# Patient Record
Sex: MALE | Race: WHITE | NOT HISPANIC OR LATINO | Employment: UNEMPLOYED | URBAN - NONMETROPOLITAN AREA
[De-identification: names, ages, dates, MRNs, and addresses within clinical notes are randomized per-mention and may not be internally consistent; named-entity substitution may affect disease eponyms.]

---

## 2017-02-16 ENCOUNTER — TRANSCRIBE ORDERS (OUTPATIENT)
Dept: PHYSICAL THERAPY | Facility: HOSPITAL | Age: 42
End: 2017-02-16

## 2017-02-16 DIAGNOSIS — M75.122 COMPLETE ROTATOR CUFF TEAR OR RUPTURE OF LEFT SHOULDER, NOT SPECIFIED AS TRAUMATIC: Primary | ICD-10-CM

## 2017-02-21 ENCOUNTER — HOSPITAL ENCOUNTER (OUTPATIENT)
Dept: PHYSICAL THERAPY | Facility: HOSPITAL | Age: 42
Setting detail: THERAPIES SERIES
Discharge: HOME | End: 2017-02-21

## 2017-02-21 DIAGNOSIS — M25.512 LEFT SHOULDER PAIN, UNSPECIFIED CHRONICITY: Primary | ICD-10-CM

## 2017-02-21 PROCEDURE — 97110 THERAPEUTIC EXERCISES: CPT | Performed by: PHYSICAL THERAPIST

## 2017-02-21 PROCEDURE — 97140 MANUAL THERAPY 1/> REGIONS: CPT | Performed by: PHYSICAL THERAPIST

## 2017-02-21 PROCEDURE — G8985 CARRY GOAL STATUS: HCPCS | Performed by: PHYSICAL THERAPIST

## 2017-02-21 PROCEDURE — G8984 CARRY CURRENT STATUS: HCPCS | Performed by: PHYSICAL THERAPIST

## 2017-02-21 PROCEDURE — 97161 PT EVAL LOW COMPLEX 20 MIN: CPT | Performed by: PHYSICAL THERAPIST

## 2017-02-21 NOTE — PROGRESS NOTES
Outpatient Physical Therapy Ortho Initial Evaluation  [facility]     Patient Name: Jose Sal  : 1975  MRN: [id number]  Today's Date: 2017      Visit Date: 2017      Visit 1 of 1. Patient only wants HEP  Recert date 3/7/17   MD visit 3/3/17      There is no problem list on file for this patient.       Past Medical History   Diagnosis Date   • Arthritis    • Diabetes mellitus    • Hypertension         No past surgical history on file.    Visit Dx:     ICD-10-CM ICD-9-CM   1. Left shoulder pain, unspecified chronicity M25.512 719.41             Patient History       17 0900          History    Chief Complaint Pain  -KW      Type of Pain Shoulder pain  -KW      Date Current Problem(s) Began 16  -KW      Brief Description of Current Complaint L shoulder pain  -KW      Previous treatment for THIS PROBLEM Injections;Medication  -KW      Hand Dominance right-handed  -KW      Occupation/sports/leisure activities [employer]  -KW      Pain     Pain Location Shoulder  -KW      Pain at Present 2  -KW      Pain at Best 2  -KW      Pain at Worst 7  -KW      Pain Description Squeezing;Sharp  -KW        User Key  (r) = Recorded By, (t) = Taken By, (c) = Cosigned By    Initials Name Provider Type    KW Roxanne Alonso, PT Physical Therapist                PT Ortho       17 1000    Subjective Comments    Subjective Comments Reports he does not want to pay $120 a week for PT. Just wants HEP  -KW    Special Tests/Palpation    Special Tests/Palpation --   positive L impingement sign  -KW    Shoulder Impingement/Rotator Cuff Special Tests    Internal Impingement Sign Left:;Positive  -KW    ROM (Range of Motion)    General ROM Detail L sh flex 0-150, abd 0-109, ext 0-65, IR 0-70, ER 0-30  -KW    MMT (Manual Muscle Testing)    General MMT Assessment Detail L sh flex 4/5, abd 3+/5, IE and ER 4/5  -KW      User Key  (r) = Recorded By, (t) = Taken By, (c) = Cosigned By    Initials Name  Provider Type    SABRINA Alonso, PT Physical Therapist                            Therapy Education       02/21/17 0955    Therapy Education    Given HEP  -KW    Program New  -KW    How Provided Demonstration  -KW    Provided to Patient  -KW    Level of Understanding Demonstrated  -KW      User Key  (r) = Recorded By, (t) = Taken By, (c) = Cosigned By    Initials Name Provider Type    SABRINA Alonso, PT Physical Therapist                PT OP Goals       02/21/17 1000          PT Short Term Goals    STG Date to Achieve 03/07/17  -KW      STG 1 decrease  to 1/10 pain in L shoulder  -KW      STG 2 Inc L shoulder AROM to WNL  -KW      STG 3 Inc L sh abd MS to 4-/5  -KW      STG 4 decrease Quick dash score to < or equal to 40  -KW      Time Calculation    PT Goal Re-Cert Due Date 03/07/17  -KW        User Key  (r) = Recorded By, (t) = Taken By, (c) = Cosigned By    Initials Name Provider Type    SABRINA Alonso, PT Physical Therapist                PT Assessment/Plan       02/21/17 1014 02/21/17 0955       PT Assessment    Functional Limitations  Impaired gait  -KW     Impairments  Pain;Muscle strength;Range of motion;Joint integrity;Joint mobility  -KW     Assessment Comments pain with most exercises  -KW      Please refer to paper survey for additional self-reported information  No  -KW     Rehab Potential  Good  -KW     Patient/caregiver participated in establishment of treatment plan and goals  Yes  -KW     Patient would benefit from skilled therapy intervention  Yes  -KW     PT Plan    Planned CPT's?  PT EVAL: 15082;PT RE-EVAL: 72838;PT THER PROC EA 15 MIN: 30967;PT THER ACT EA 15 MIN: 17419;PT MANUAL THERAPY EA 15 MIN: 26770;PT THER SUPP EA 15 MIN  -KW     Physical Therapy Interventions (Optional Details)  home exercise program;ROM (Range of Motion);strengthening;stretching;modalities;manual therapy techniques  -KW     PT Plan Comments  Patient wants to do HEP only  -KW       User Key  (r) = Recorded  By, (t) = Taken By, (c) = Cosigned By    Initials Name Provider Type    SABRINA Alonso, ASHLIE Physical Therapist                Modalities       02/21/17 1000          Ice    Rx Minutes 10 mins  -KW      Ice S/P Rx Yes  -KW        User Key  (r) = Recorded By, (t) = Taken By, (c) = Cosigned By    Initials Name Provider Type    SABRINA Alonso, PT Physical Therapist              Exercises       02/21/17 1000          Subjective Comments    Subjective Comments Reports he does not want to pay $120 a week for PT. Just wants HEP  -KW      Subjective Pain    Able to rate subjective pain? yes  -KW      Pre-Treatment Pain Level 2  -KW      Post-Treatment Pain Level 2  -KW      Exercise 1    Exercise Name 1 pulleys  -KW      Time (Minutes) 1 10 min  -KW      Exercise 2    Exercise Name 2 4 way shoulder w/ Red TB  -KW      Sets 2 1  -KW      Reps 2 10  -KW        User Key  (r) = Recorded By, (t) = Taken By, (c) = Cosigned By    Initials Name Provider Type    SABRINA Alonso, ASHLIE Physical Therapist           Manual Rx (last 36 hours)      Manual Treatments       02/21/17 0900          Manual Rx 1    Manual Rx 1 Location L shoulder  -KW      Manual Rx 1 Type cupping  -KW      Manual Rx 1 Duration 10 min  -KW        User Key  (r) = Recorded By, (t) = Taken By, (c) = Cosigned By    Initials Name Provider Type    SABRINA Alonso, PT Physical Therapist                            Outcome Measures       02/21/17 1000          Quick DASH    Open a tight or new jar. 1  -KW      Do heavy household chores (e.g., wash walls, wash floors) 3  -KW      Carry a shopping bag or briefcase 3  -KW      Wash your back 5  -KW      Use a knife to cut food 1  -KW      Recreational activities in which you take some force or impact through your arm, should or hand (e.g. golf, hammering, tennis, etc.) 4  -KW      During the past week, to what extent has your arm, shoulder, or hand problem interfered with your normal social activites with  family, friends, neighbors or groups? 3  -KW      During the past week, were you limited in your work or other regular daily activities as a result of your arm, shoulder or hand problem? 3  -KW      Arm, Shoulder, or hand pain 3  -KW      Tingling (pins and needles) in your arm, shoulder, or hand 4  -KW      During the past week, how much difficulty have you had sleeping because of the pain in your arm, shoulder or hand? 3  -KW      Number of Questions Answered 11  -KW      Quick DASH Score 50  -KW      Functional Assessment    Outcome Measure Options Quick DASH  -KW        User Key  (r) = Recorded By, (t) = Taken By, (c) = Cosigned By    Initials Name Provider Type    KW Roxanne Alonso, PT Physical Therapist            Time Calculation:   Start Time: 0900  Stop Time: 1000  Time Calculation (min): 60 min  Total Timed Code Minutes- PT: 60 minute(s)     Therapy Charges for Today     Code Description Service Date Service Provider Modifiers Qty    98269428845 HC PT CARRY MOV HAND OBJ CURRENT 2/21/2017 Roxanne Alonso, PT GP, CK 1    58686925537 HC PT CARRY MOV HAND OBJ PROJECTED 2/21/2017 Roxanne Alonso, PT GP, CJ 1    73653713097 HC PT THER PROC EA 15 MIN 2/21/2017 Roxanne Alonso, PT GP 1    49768624207 HC PT MANUAL THERAPY EA 15 MIN 2/21/2017 Roxanne Alonso, PT GP 1    22390126437 HC PT EVAL LOW COMPLEXITY 2 2/21/2017 Roxanne Alonso, PT GP 1          PT G-Codes  PT Professional Judgement Used?: Yes  Outcome Measure Options: Quick DASH  Functional Limitation: Carrying, moving and handling objects  Carrying, Moving and Handling Objects Current Status (): At least 40 percent but less than 60 percent impaired, limited or restricted  Carrying, Moving and Handling Objects Goal Status (): At least 20 percent but less than 40 percent impaired, limited or restricted         Roxanne Alonso, PT  2/21/2017

## 2017-04-06 ENCOUNTER — HOSPITAL ENCOUNTER (OUTPATIENT)
Dept: PHYSICAL THERAPY | Facility: HOSPITAL | Age: 42
Setting detail: THERAPIES SERIES
Discharge: HOME | End: 2017-04-06

## 2017-04-06 DIAGNOSIS — M12.812 ROTATOR CUFF ARTHROPATHY, LEFT: Primary | ICD-10-CM

## 2017-04-06 PROCEDURE — 97110 THERAPEUTIC EXERCISES: CPT | Performed by: PHYSICAL THERAPIST

## 2017-04-06 PROCEDURE — G8985 CARRY GOAL STATUS: HCPCS | Performed by: PHYSICAL THERAPIST

## 2017-04-06 PROCEDURE — G8984 CARRY CURRENT STATUS: HCPCS | Performed by: PHYSICAL THERAPIST

## 2017-04-06 PROCEDURE — 97162 PT EVAL MOD COMPLEX 30 MIN: CPT | Performed by: PHYSICAL THERAPIST

## 2017-04-06 NOTE — PROGRESS NOTES
Outpatient Physical Therapy Ortho Initial Evaluation  [facility]     Patient Name: Jose Sal  : 1975  MRN: [id number]  Today's Date: 2017      Visit Date: 2017    Visit   Recert date 17  MD visit 17                There is no problem list on file for this patient.       Past Medical History:   Diagnosis Date   • Arthritis    • Diabetes mellitus    • Hypertension         No past surgical history on file.    Visit Dx:     ICD-10-CM ICD-9-CM   1. Rotator cuff arthropathy, left M12.812 716.81             Patient History       17 1100          History    Chief Complaint Pain  -KW      Type of Pain Shoulder pain  -KW      Patient/Caregiver Goals Relieve pain  -KW      Current Tobacco Use none  -KW      Smoking Status none  -KW      Patient's Rating of General Health Good  -KW      Hand Dominance right-handed  -KW      Surgery CPT Code 1 --   L shoulder scope  -KW      Surgery Date 1 17  -KW      Pain     Pain Location Shoulder  -KW      Pain at Present 4  -KW      Pain at Best 3  -KW      Pain at Worst 8  -KW      Pain Frequency Intermittent  -KW      What Performance Factors Make the Current Problem(s) WORSE? movement  -KW      What Performance Factors Make the Current Problem(s) BETTER? rest meds  -KW      Fall Risk Assessment    Any falls in the past year: No  -KW      Daily Activities    Primary Language English  -KW        User Key  (r) = Recorded By, (t) = Taken By, (c) = Cosigned By    Initials Name Provider Type    KW Roxanne Alonso, PT Physical Therapist                PT Ortho       17 1100    Precautions and Contraindications    Precautions L shoulder scope done on 3/30/17  -KW    Posture/Observations    Posture- WNL Posture is WNL  -KW    Special Tests/Palpation    Special Tests/Palpation --   Held sec to recent sx  -KW    ROM (Range of Motion)    General ROM Detail PROM L sh flex 60, abd 42, ER 9, IR 70  -KW    MMT (Manual Muscle Testing)     General MMT Assessment Detail Held sec to recent sx  -KW      User Key  (r) = Recorded By, (t) = Taken By, (c) = Cosigned By    Initials Name Provider Type    SABRINA Alonso, ASHLIE Physical Therapist                            Therapy Education       04/06/17 1152          Therapy Education    Given HEP  -KW      Program New  -KW      How Provided Demonstration  -KW      Provided to Patient  -KW      Level of Understanding Demonstrated  -KW        User Key  (r) = Recorded By, (t) = Taken By, (c) = Cosigned By    Initials Name Provider Type    SABRINA Alonso, PT Physical Therapist                PT OP Goals       04/06/17 1200       PT Short Term Goals    STG Date to Achieve 04/18/17  -KW     STG 1 decrease  to 2/10 pain in L shoulder  -KW     STG 2 Inc L sh PROM by 10 degrees in all planes  -KW     STG 3 Reduce edema in L hand from mod to min  -KW     STG 4 --  -KW     Long Term Goals    LTG Date to Achieve 04/27/17  -KW     LTG 1 Ind w/ final HEP  -KW     LTG 2 eliminate edema in L hand  -KW     LTG 3 Decrease quick dach score  to 75 or less  -KW     Time Calculation    PT Goal Re-Cert Due Date 04/27/17  -KW       User Key  (r) = Recorded By, (t) = Taken By, (c) = Cosigned By    Initials Name Provider Type    SABRINA Alonso, PT Physical Therapist                PT Assessment/Plan       04/06/17 1815 04/06/17 1810    PT Assessment    Functional Limitations  Limitation in home management;Limitations in community activities;Performance in leisure activities  -KW    Impairments  Joint integrity;Joint mobility;Range of motion;Endurance;Muscle strength;Pain;Edema  -KW    Assessment Comments  Pt still very painful from L scope sx 1 weeks ago. Tolerated pendellum ok  -KW    Please refer to paper survey for additional self-reported information  No  -KW    Rehab Potential  Good  -KW    Patient/caregiver participated in establishment of treatment plan and goals  Yes  -KW    Patient would benefit from skilled  therapy intervention  Yes  -KW    PT Plan    PT Frequency  2x/week  -KW    Predicted Duration of Therapy Intervention (days/wks)  4 weeks  -KW    Planned CPT's? PT EVAL MOD COMPLELITY: 84264  -KW PT THER ACT EA 15 MIN: 82127;PT AQUATIC THERAPY EA 15 MIN: 80801;PT THER SUPP EA 15 MIN;PT RE-EVAL: 30120;PT THER PROC EA 15 MIN: 00149;PT ELECTRICAL STIM UNATTEND: ;PT MANUAL THERAPY EA 15 MIN: 11255  -KW    Physical Therapy Interventions (Optional Details)  aquatics exercise;ROM (Range of Motion);manual therapy techniques;modalities;strengthening;stretching;swiss ball techniques;home exercise program;joint mobilization  -KW    PT Plan Comments  PT 1x per week until MD visit 4/14/17  -KW      User Key  (r) = Recorded By, (t) = Taken By, (c) = Cosigned By    Initials Name Provider Type    SABRINA Alonso, PT Physical Therapist                Modalities       04/06/17 1100          Ice    Ice Applied Yes  -KW      Location L shoulder  -KW      Rx Minutes 12 mins  -KW      Ice S/P Rx Yes  -KW        User Key  (r) = Recorded By, (t) = Taken By, (c) = Cosigned By    Initials Name Provider Type    SABRINA Alonso, PT Physical Therapist              Exercises       04/06/17 1100          Subjective Comments    Subjective Comments PT is one week post op L shoulder scope on 3/30/17  -KW      Subjective Pain    Able to rate subjective pain? yes  -KW      Pre-Treatment Pain Level 4  -KW      Post-Treatment Pain Level 5  -KW      Exercise 1    Exercise Name 1 pendullum circles  -KW      Sets 1 4  -KW      Reps 1 10  -KW      Exercise 2    Exercise Name 2 elbow flex  -KW      Sets 2 2  -KW      Reps 2 10  -KW      Exercise 3    Exercise Name 3 walk it out  -KW      Sets 3 1  -KW      Reps 3 10  -KW        User Key  (r) = Recorded By, (t) = Taken By, (c) = Cosigned By    Initials Name Provider Type    SABRINA Alonso, ASHLIE Physical Therapist                              Outcome Measures       04/06/17 1200          Quick  DASH    Open a tight or new jar. 5  -KW      Do heavy household chores (e.g., wash walls, wash floors) 5  -KW      Carry a shopping bag or briefcase 5  -KW      Wash your back 5  -KW      Use a knife to cut food 5  -KW      Recreational activities in which you take some force or impact through your arm, should or hand (e.g. golf, hammering, tennis, etc.) 5  -KW      During the past week, to what extent has your arm, shoulder, or hand problem interfered with your normal social activites with family, friends, neighbors or groups? 5  -KW      During the past week, were you limited in your work or other regular daily activities as a result of your arm, shoulder or hand problem? 5  -KW      Arm, Shoulder, or hand pain 3  -KW      Tingling (pins and needles) in your arm, shoulder, or hand 5  -KW      During the past week, how much difficulty have you had sleeping because of the pain in your arm, shoulder or hand? 5  -KW      Number of Questions Answered 11  -KW      Quick DASH Score 95.45  -KW      Functional Assessment    Outcome Measure Options Quick DASH  -KW        User Key  (r) = Recorded By, (t) = Taken By, (c) = Cosigned By    Initials Name Provider Type    KW Roxanne Alonso, PT Physical Therapist            Time Calculation:   Start Time: 1110  Stop Time: 1200  Time Calculation (min): 50 min  Total Timed Code Minutes- PT: 16 minute(s)     Therapy Charges for Today     Code Description Service Date Service Provider Modifiers Qty    77320777196 HC PT THER SUPP EA 15 MIN 4/6/2017 Roxanne Alonso, PT GP 1    06623444389 HC PT THER PROC EA 15 MIN 4/6/2017 Roxanne Alonso, PT GP 1    23297477073 HC PT EVAL MOD COMPLEXITY 1 4/6/2017 Roxanne Alonso, PT GP 1    39151663041 HC PT CARRY MOV HAND OBJ CURRENT 4/6/2017 Roxanne Alonso, PT GP, CM 1    27047304987 HC PT CARRY MOV HAND OBJ PROJECTED 4/6/2017 Roxanne Alonso, PT GP, CK 1          PT G-Codes  PT Professional Judgement Used?: Yes  Outcome Measure Options:  Quick DASH  Score: 95.45  Functional Limitation: Carrying, moving and handling objects  Carrying, Moving and Handling Objects Current Status (): At least 80 percent but less than 100 percent impaired, limited or restricted  Carrying, Moving and Handling Objects Goal Status (): At least 40 percent but less than 60 percent impaired, limited or restricted         Roxanne Alonso, PT  4/6/2017

## 2017-04-13 ENCOUNTER — HOSPITAL ENCOUNTER (OUTPATIENT)
Dept: PHYSICAL THERAPY | Facility: HOSPITAL | Age: 42
Setting detail: THERAPIES SERIES
End: 2017-04-13

## 2017-04-17 ENCOUNTER — HOSPITAL ENCOUNTER (OUTPATIENT)
Dept: PHYSICAL THERAPY | Facility: HOSPITAL | Age: 42
Setting detail: THERAPIES SERIES
Discharge: HOME | End: 2017-04-17

## 2017-04-17 DIAGNOSIS — M12.812 ROTATOR CUFF ARTHROPATHY, LEFT: ICD-10-CM

## 2017-04-17 DIAGNOSIS — M25.512 CHRONIC LEFT SHOULDER PAIN: Primary | ICD-10-CM

## 2017-04-17 DIAGNOSIS — G89.29 CHRONIC LEFT SHOULDER PAIN: Primary | ICD-10-CM

## 2017-04-17 PROCEDURE — 97110 THERAPEUTIC EXERCISES: CPT | Performed by: PHYSICAL THERAPIST

## 2017-04-17 PROCEDURE — 97140 MANUAL THERAPY 1/> REGIONS: CPT | Performed by: PHYSICAL THERAPIST

## 2017-04-17 NOTE — PROGRESS NOTES
Outpatient Physical Therapy Ortho Treatment Note  [facility]     Patient Name: Jose Sal  : 1975  MRN: [id number]  Today's Date: 2017      Visit Date: 2017      Visit 2/3  Recert date 17  MD visit 5/15/17  % improvement 10%        Visit Dx:    ICD-10-CM ICD-9-CM   1. Chronic left shoulder pain M25.512 719.41    G89.29 338.29   2. Rotator cuff arthropathy, left M12.812 716.81       There is no problem list on file for this patient.       Past Medical History:   Diagnosis Date   • Arthritis    • Diabetes mellitus    • Hypertension         No past surgical history on file.                          PT Assessment/Plan       17 1150       PT Assessment    Assessment Comments more pain in L elbow. Difficulty with L elbow full ext.   -KW     PT Plan    PT Plan Comments continue PT plan of care L shouder activites as tolerated as per MD  -KW       User Key  (r) = Recorded By, (t) = Taken By, (c) = Cosigned By    Initials Name Provider Type    SABRINA Alonso, PT Physical Therapist                Modalities       17 1100          Ice    Ice Applied Yes  -KW      Location L shoulder  -KW      Rx Minutes 12 mins  -KW      Ice S/P Rx Yes  -KW        User Key  (r) = Recorded By, (t) = Taken By, (c) = Cosigned By    Initials Name Provider Type    SABRINA Alonso, PT Physical Therapist                Exercises       17 1100          Subjective Comments    Subjective Comments Pt is 2 weeks post op and reports much better mobility L shoulder  -KW      Subjective Pain    Able to rate subjective pain? yes  -KW      Pre-Treatment Pain Level 4  -KW      Post-Treatment Pain Level 6  -KW      Exercise 1    Exercise Name 1 pendullum circles  -KW      Sets 1 4  -KW      Reps 1 10  -KW      Time (Minutes) 1 10 min  -KW      Exercise 2    Exercise Name 2 elbow flex  -KW      Sets 2 2  -KW      Reps 2 10  -KW      Exercise 3    Exercise Name 3 walk it out  -KW      Sets 3 1   -KW      Reps 3 10  -KW      Exercise 4    Exercise Name 4 wand in flexion  -KW      Sets 4 2  -KW      Reps 4 10  -KW      Exercise 5    Exercise Name 5 pulleys flexion  -KW      Reps 5 20  -KW        User Key  (r) = Recorded By, (t) = Taken By, (c) = Cosigned By    Initials Name Provider Type    SABRINA Alonso, PT Physical Therapist                        Manual Rx (last 36 hours)      Manual Treatments       04/17/17 1100          Manual Rx 1    Manual Rx 1 Location L shoulder   -KW      Manual Rx 1 Type cupping  -KW      Manual Rx 1 Duration 15  -KW        User Key  (r) = Recorded By, (t) = Taken By, (c) = Cosigned By    Initials Name Provider Type    SABRINA Alonso, PT Physical Therapist                PT OP Goals       04/17/17 1100       PT Short Term Goals    STG Date to Achieve 04/18/17  -KW     STG 1 decrease  to 2/10 pain in L shoulder  -KW     STG 2 Inc L sh PROM by 10 degrees in all planes  -KW     STG 3 Reduce edema in L hand from mod to min  -KW     STG 4 decrease Quick dash score to < or equal to 40  -KW     Long Term Goals    LTG Date to Achieve 04/27/17  -KW     LTG 1 Ind w/ final HEP  -KW     LTG 2 eliminate edema in L hand  -KW     LTG 3 Decrease quick dach score  to 75 or less  -KW     Time Calculation    PT Goal Re-Cert Due Date 04/27/17  -KW       User Key  (r) = Recorded By, (t) = Taken By, (c) = Cosigned By    Initials Name Provider Type    SABRINA Alonso, PT Physical Therapist                Therapy Education       04/17/17 1150          Therapy Education    Given Pain management  -KW        User Key  (r) = Recorded By, (t) = Taken By, (c) = Cosigned By    Initials Name Provider Type    SABRINA Alonso, PT Physical Therapist                Time Calculation:   Start Time: 1100  Stop Time: 1145  Time Calculation (min): 45 min  Total Timed Code Minutes- PT: 45 minute(s)    Therapy Charges for Today     Code Description Service Date Service Provider Modifiers Qty     86394832908  PT THER SUPP EA 15 MIN 4/17/2017 Roxanne Alonso, PT GP 1    81097174186 HC PT THER PROC EA 15 MIN 4/17/2017 Roxanne Alonso, PT GP 2    84229476533 HC PT MANUAL THERAPY EA 15 MIN 4/17/2017 Roxanne Alonso, PT GP 1                    Roxanne Alonso, PT  4/17/2017

## 2017-04-24 ENCOUNTER — HOSPITAL ENCOUNTER (OUTPATIENT)
Dept: PHYSICAL THERAPY | Facility: HOSPITAL | Age: 42
Setting detail: THERAPIES SERIES
Discharge: HOME | End: 2017-04-24

## 2017-04-24 DIAGNOSIS — M25.512 CHRONIC LEFT SHOULDER PAIN: Primary | ICD-10-CM

## 2017-04-24 DIAGNOSIS — M12.812 ROTATOR CUFF ARTHROPATHY, LEFT: ICD-10-CM

## 2017-04-24 DIAGNOSIS — G89.29 CHRONIC LEFT SHOULDER PAIN: Primary | ICD-10-CM

## 2017-04-24 PROCEDURE — 97110 THERAPEUTIC EXERCISES: CPT | Performed by: PHYSICAL THERAPIST

## 2017-04-24 NOTE — PROGRESS NOTES
Outpatient Physical Therapy Ortho Progress Note  [facility]     Patient Name: Jose Sal  : 1975  MRN: [id number]  Today's Date: 2017      Visit Date: 2017      Visit 3/4  Recert date 5/15/17  MD visit 5/15/17  15% improvement          Visit Dx:    ICD-10-CM ICD-9-CM   1. Chronic left shoulder pain M25.512 719.41    G89.29 338.29   2. Rotator cuff arthropathy, left M12.812 716.81       There is no problem list on file for this patient.       Past Medical History:   Diagnosis Date   • Arthritis    • Diabetes mellitus    • Hypertension         No past surgical history on file.          PT Ortho       17 0800    ROM (Range of Motion)    General ROM Detail PROM L shouder flex 154, abd 132, IR 60, ER 82  -KW      User Key  (r) = Recorded By, (t) = Taken By, (c) = Cosigned By    Initials Name Provider Type    KW Roxanne Alonso, PT Physical Therapist                            PT Assessment/Plan       17 0910       PT Assessment    Functional Limitations Limitation in home management;Limitations in community activities;Performance in leisure activities;Performance in self-care ADL  -KW     Impairments Joint integrity;Range of motion;Joint mobility;Dexterity;Edema;Impaired flexibility;Muscle strength;Pain  -KW     Assessment Comments tenderness to ticeps tendon in the elbow, moderate swelling at L elbow. PROM flex at full ROM  -KW     Please refer to paper survey for additional self-reported information No  -KW     Rehab Potential Good  -KW     Patient/caregiver participated in establishment of treatment plan and goals Yes  -KW     Patient would benefit from skilled therapy intervention Yes  -KW     PT Plan    PT Frequency 2x/week  -KW     Predicted Duration of Therapy Intervention (days/wks) 4 weeks  -KW     Planned CPT's? PT RE-EVAL: 90510;PT THER ACT EA 15 MIN: 74496;PT ULTRASOUND EA 15 MIN: 16694;PT THER SUPP EA 15 MIN;PT MANUAL THERAPY EA 15 MIN: 11510;PT ELECTRICAL  STIM UNATTEND: ;PT THER PROC EA 15 MIN: 32883  -KW     Physical Therapy Interventions (Optional Details) aquatics exercise;ROM (Range of Motion);manual therapy techniques;strengthening;stretching;modalities;swiss ball techniques;home exercise program;joint mobilization  -KW     PT Plan Comments PT 2x per week L shoulder scope, activites as tolerated.  -KW       User Key  (r) = Recorded By, (t) = Taken By, (c) = Cosigned By    Initials Name Provider Type    SABRINA Alonso, ASHLIE Physical Therapist                Modalities       04/24/17 0800          Ice    Ice Applied Yes  -KW      Location L shoulder  -KW      Rx Minutes 12 mins  -KW      Ice S/P Rx Yes  -KW        User Key  (r) = Recorded By, (t) = Taken By, (c) = Cosigned By    Initials Name Provider Type    SABRINA Alonso, ASHLIE Physical Therapist                Exercises       04/24/17 0900 04/24/17 0800       Subjective Comments    Subjective Comments  Pt is 3 weeks post op and doing well  -KW     Subjective Pain    Able to rate subjective pain?  yes  -KW     Pre-Treatment Pain Level  3  -KW     Post-Treatment Pain Level  3  -KW     Exercise 1    Exercise Name 1 supine wand  -KW      Weights/Plates 1 1  -KW      Sets 1 2  -KW      Reps 1 10  -KW      Time (Minutes) 1 --  -KW      Exercise 2    Exercise Name 2 elbow flex  -KW      Weights/Plates 2 1  -KW      Sets 2 2  -KW      Reps 2 10  -KW      Exercise 3    Exercise Name 3 Red TB 4 way  -KW      Sets 3 2  -KW      Reps 3 10  -KW      Exercise 4    Exercise Name 4 PROM L shoulder  -KW      Sets 4 --  -KW      Reps 4 --  -KW      Time (Minutes) 4 8  -KW      Exercise 5    Exercise Name 5 pulleys flexion  -KW      Reps 5 20  -KW        User Key  (r) = Recorded By, (t) = Taken By, (c) = Cosigned By    Initials Name Provider Type    SABRINA Alonso, ASHLIE Physical Therapist                               PT OP Goals       04/24/17 0900       PT Short Term Goals    STG Date to Achieve 04/18/17  -KW      STG 1 decrease  to 2/10 pain in L shoulder  -KW     STG 2 L shoulder AROM 0-90 in flex and abd (in standing)  -KW     STG 3 Reduce edema in L hand from min to wnl  -KW     STG 4 decrease Quick dash score to < or equal to 40  -KW     Long Term Goals    LTG Date to Achieve 04/27/17  -KW     LTG 1 Ind w/ final HEP  -KW     LTG 2 eliminate edema in L hand  -KW     LTG 3 Decrease quick dach score  to 75 or less  -KW     LTG 4 Inc L shoulder AROM to full ROM  -KW     LTG 5 Inc L shoulder flex and ABD to 4-/5 MMT  -KW     Time Calculation    PT Goal Re-Cert Due Date 05/15/17  -KW       User Key  (r) = Recorded By, (t) = Taken By, (c) = Cosigned By    Initials Name Provider Type    SABRINA Alonso, PT Physical Therapist                Therapy Education       04/24/17 0906          Therapy Education    Given HEP  -KW      Program Reinforced  -KW      How Provided Verbal  -KW      Provided to Patient  -KW      Level of Understanding Verbalized  -KW        User Key  (r) = Recorded By, (t) = Taken By, (c) = Cosigned By    Initials Name Provider Type    SABRINA Alonso, PT Physical Therapist                Time Calculation:   Start Time: 0815  Stop Time: 0905  Time Calculation (min): 50 min  Total Timed Code Minutes- PT: 50 minute(s)    Therapy Charges for Today     Code Description Service Date Service Provider Modifiers Qty    48144412406 HC PT THER PROC EA 15 MIN 4/24/2017 Roxanne Alonso, PT GP 3                    Roxanne Alonso, PT  4/24/2017

## 2017-04-27 ENCOUNTER — HOSPITAL ENCOUNTER (OUTPATIENT)
Dept: PHYSICAL THERAPY | Facility: HOSPITAL | Age: 42
Setting detail: THERAPIES SERIES
Discharge: HOME | End: 2017-04-27

## 2017-04-27 DIAGNOSIS — Z98.890 S/P ROTATOR CUFF REPAIR: Primary | ICD-10-CM

## 2017-04-27 PROCEDURE — 97110 THERAPEUTIC EXERCISES: CPT | Performed by: PHYSICAL THERAPIST

## 2017-04-27 NOTE — PROGRESS NOTES
Outpatient Physical Therapy Ortho Treatment Note  [facility]     Patient Name: Jose Sal  : 1975  MRN: [id number]  Today's Date: 2017      Visit Date: 2017      Visit   Recert date 5/15/17  MD visit 5/15/17  15% improvement            Visit Dx:    ICD-10-CM ICD-9-CM   1. S/P rotator cuff repair Z98.890 V45.89       There is no problem list on file for this patient.       Past Medical History:   Diagnosis Date   • Arthritis    • Diabetes mellitus    • Hypertension         No past surgical history on file.                          PT Assessment/Plan       17 0852       PT Assessment    Assessment Comments better IR ER flexibitily  -KW     PT Plan    PT Plan Comments cont activities as tolerated L shoulder  -KW       User Key  (r) = Recorded By, (t) = Taken By, (c) = Cosigned By    Initials Name Provider Type    KW Roxanne Alonso, PT Physical Therapist                    Exercises       17 0800          Subjective Comments    Subjective Comments Reports no increase in in pain and improved shoulder flexibility  -KW      Subjective Pain    Able to rate subjective pain? yes  -KW      Pre-Treatment Pain Level 3  -KW      Post-Treatment Pain Level 3  -KW      Exercise 1    Exercise Name 1 supine wand  -KW      Weights/Plates 1 3  -KW      Sets 1 3  -KW      Reps 1 10  -KW      Exercise 2    Exercise Name 2 flexion  -KW      Weights/Plates 2 2  -KW      Sets 2 3  -KW      Reps 2 10  -KW      Exercise 3    Exercise Name 3 scaption  -KW      Sets 3 3  -KW      Reps 3 10  -KW      Exercise 4    Exercise Name 4 prone ext  -KW      Weights/Plates 4 2  -KW      Sets 4 3  -KW      Reps 4 10  -KW      Exercise 5    Exercise Name 5 prone y  -KW      Sets 5 3  -KW      Reps 5 10  -KW      Exercise 6    Exercise Name 6 side lying abd  -KW      Weights/Plates 6 2  -KW      Sets 6 3  -KW      Reps 6 10  -KW      Exercise 7    Exercise Name 7 sidelying ER  -KW      Sets 7 3  -KW       Reps 7 10  -KW      Exercise 8    Exercise Name 8 stretch IR and ER  -KW      Time (Minutes) 8 2  -KW        User Key  (r) = Recorded By, (t) = Taken By, (c) = Cosigned By    Initials Name Provider Type    SABRINA Alonso, ASHLIE Physical Therapist                               PT OP Goals       04/27/17 0800       PT Short Term Goals    STG Date to Achieve 05/08/17  -KW     STG 1 decrease  to 2/10 pain in L shoulder  -KW     STG 1 Progress Progressing  -KW     STG 2 L shoulder AROM 0-90 in flex and abd (in standing)  -KW     STG 2 Progress Progressing  -KW     STG 3 Reduce edema in L hand from min to wnl  -KW     STG 3 Progress Progressing  -KW     STG 4 decrease Quick dash score to < or equal to 40  -KW     STG 4 Progress Progressing  -KW     Long Term Goals    LTG Date to Achieve 05/15/17  -KW     LTG 1 Ind w/ final HEP  -KW     LTG 1 Progress Progressing  -KW     LTG 2 eliminate edema in L hand  -KW     LTG 2 Progress Progressing  -KW     LTG 3 Decrease quick dach score  to 75 or less  -KW     LTG 3 Progress Progressing  -KW     LTG 4 Inc L shoulder AROM to full ROM  -KW     LTG 4 Progress Progressing  -KW     LTG 5 Inc L shoulder flex and ABD to 4-/5 MMT  -KW     LTG 5 Progress Progressing  -KW     Time Calculation    PT Goal Re-Cert Due Date 05/15/17  -KW       User Key  (r) = Recorded By, (t) = Taken By, (c) = Cosigned By    Initials Name Provider Type    SABRINA Alonso, ASHLIE Physical Therapist                Therapy Education       04/27/17 0803          Therapy Education    Given HEP  -KW      Program Reinforced  -KW      How Provided Verbal  -KW      Provided to Patient  -KW      Level of Understanding Verbalized  -KW        User Key  (r) = Recorded By, (t) = Taken By, (c) = Cosigned By    Initials Name Provider Type    SABRINA Alonso, ASHLIE Physical Therapist                Time Calculation:   Start Time: 0800  Stop Time: 0845  Time Calculation (min): 45 min  Total Timed Code Minutes- PT: 45  minute(s)    Therapy Charges for Today     Code Description Service Date Service Provider Modifiers Qty    [id number]  PT THER PROC EA 15 MIN 4/27/2017 Roxanne Alonso, PT GP 3                    Roxanne Alonso, PT  4/27/2017

## 2017-05-03 ENCOUNTER — HOSPITAL ENCOUNTER (OUTPATIENT)
Dept: PHYSICAL THERAPY | Facility: HOSPITAL | Age: 42
Setting detail: THERAPIES SERIES
Discharge: HOME | End: 2017-05-03

## 2017-05-03 DIAGNOSIS — G89.29 CHRONIC LEFT SHOULDER PAIN: ICD-10-CM

## 2017-05-03 DIAGNOSIS — M25.512 CHRONIC LEFT SHOULDER PAIN: ICD-10-CM

## 2017-05-03 DIAGNOSIS — Z98.890 S/P ROTATOR CUFF REPAIR: Primary | ICD-10-CM

## 2017-05-03 PROCEDURE — 97110 THERAPEUTIC EXERCISES: CPT | Performed by: PHYSICAL THERAPIST

## 2017-05-07 ENCOUNTER — HOSPITAL ENCOUNTER (EMERGENCY)
Facility: HOSPITAL | Age: 42
Discharge: HOME | End: 2017-05-07
Attending: EMERGENCY MEDICINE | Admitting: EMERGENCY MEDICINE

## 2017-05-07 ENCOUNTER — HOSPITAL ENCOUNTER (OUTPATIENT)
Dept: CT IMAGING | Facility: HOSPITAL | Age: 42
Discharge: HOME | End: 2017-05-07
Admitting: FAMILY MEDICINE

## 2017-05-07 VITALS
TEMPERATURE: 99.2 F | RESPIRATION RATE: 20 BRPM | WEIGHT: 315 LBS | DIASTOLIC BLOOD PRESSURE: 73 MMHG | HEIGHT: 73 IN | BODY MASS INDEX: 41.75 KG/M2 | HEART RATE: 98 BPM | SYSTOLIC BLOOD PRESSURE: 150 MMHG | OXYGEN SATURATION: 97 %

## 2017-05-07 DIAGNOSIS — M25.529 ELBOW PAIN: ICD-10-CM

## 2017-05-07 DIAGNOSIS — M71.122 SEPTIC OLECRANON BURSITIS OF LEFT ELBOW: Primary | ICD-10-CM

## 2017-05-07 LAB
ANION GAP SERPL CALCULATED.3IONS-SCNC: 16 MMOL/L (ref 5–15)
BASOPHILS # BLD AUTO: 0.02 10*3/MM3 (ref 0–0.2)
BASOPHILS NFR BLD AUTO: 0.2 % (ref 0–2)
BUN BLD-MCNC: 18 MG/DL (ref 7–21)
BUN/CREAT SERPL: 18.8 (ref 7–25)
CALCIUM SPEC-SCNC: 9.6 MG/DL (ref 8.4–10.2)
CHLORIDE SERPL-SCNC: 102 MMOL/L (ref 95–110)
CO2 SERPL-SCNC: 23 MMOL/L (ref 22–31)
CREAT BLD-MCNC: 0.96 MG/DL (ref 0.7–1.3)
CRP SERPL-MCNC: 13.8 MG/DL (ref 0–1)
DEPRECATED RDW RBC AUTO: 40.8 FL (ref 35.1–43.9)
EOSINOPHIL # BLD AUTO: 0.4 10*3/MM3 (ref 0–0.7)
EOSINOPHIL NFR BLD AUTO: 4.1 % (ref 0–7)
ERYTHROCYTE [DISTWIDTH] IN BLOOD BY AUTOMATED COUNT: 13.5 % (ref 11.5–14.5)
ERYTHROCYTE [SEDIMENTATION RATE] IN BLOOD: 49 MM/HR (ref 0–15)
GFR SERPL CREATININE-BSD FRML MDRD: 87 ML/MIN/1.73 (ref 63–147)
GLUCOSE BLD-MCNC: 111 MG/DL (ref 60–100)
HCT VFR BLD AUTO: 33.9 % (ref 39–49)
HGB BLD-MCNC: 11.4 G/DL (ref 13.7–17.3)
IMM GRANULOCYTES # BLD: 0.02 10*3/MM3 (ref 0–0.02)
IMM GRANULOCYTES NFR BLD: 0.2 % (ref 0–0.5)
LYMPHOCYTES # BLD AUTO: 2.55 10*3/MM3 (ref 0.6–4.2)
LYMPHOCYTES NFR BLD AUTO: 26.5 % (ref 10–50)
MCH RBC QN AUTO: 27.5 PG (ref 26.5–34)
MCHC RBC AUTO-ENTMCNC: 33.6 G/DL (ref 31.5–36.3)
MCV RBC AUTO: 81.9 FL (ref 80–98)
MONOCYTES # BLD AUTO: 0.82 10*3/MM3 (ref 0–0.9)
MONOCYTES NFR BLD AUTO: 8.5 % (ref 0–12)
NEUTROPHILS # BLD AUTO: 5.83 10*3/MM3 (ref 2–8.6)
NEUTROPHILS NFR BLD AUTO: 60.5 % (ref 37–80)
PLATELET # BLD AUTO: 288 10*3/MM3 (ref 150–450)
PMV BLD AUTO: 10.9 FL (ref 8–12)
POTASSIUM BLD-SCNC: 3.9 MMOL/L (ref 3.5–5.1)
RBC # BLD AUTO: 4.14 10*6/MM3 (ref 4.37–5.74)
SODIUM BLD-SCNC: 141 MMOL/L (ref 137–145)
URATE SERPL-MCNC: 9.9 MG/DL (ref 2.5–8.5)
WBC NRBC COR # BLD: 9.64 10*3/MM3 (ref 3.2–9.8)

## 2017-05-07 PROCEDURE — 85025 COMPLETE CBC W/AUTO DIFF WBC: CPT | Performed by: EMERGENCY MEDICINE

## 2017-05-07 PROCEDURE — 80048 BASIC METABOLIC PNL TOTAL CA: CPT | Performed by: EMERGENCY MEDICINE

## 2017-05-07 PROCEDURE — 99283 EMERGENCY DEPT VISIT LOW MDM: CPT

## 2017-05-07 PROCEDURE — 84550 ASSAY OF BLOOD/URIC ACID: CPT | Performed by: EMERGENCY MEDICINE

## 2017-05-07 PROCEDURE — 73200 CT UPPER EXTREMITY W/O DYE: CPT

## 2017-05-07 PROCEDURE — 86140 C-REACTIVE PROTEIN: CPT | Performed by: EMERGENCY MEDICINE

## 2017-05-07 PROCEDURE — 96365 THER/PROPH/DIAG IV INF INIT: CPT

## 2017-05-07 PROCEDURE — 85651 RBC SED RATE NONAUTOMATED: CPT | Performed by: EMERGENCY MEDICINE

## 2017-05-07 RX ORDER — INDOMETHACIN 50 MG/1
50 CAPSULE ORAL 3 TIMES DAILY PRN
Qty: 25 CAPSULE | Refills: 0 | Status: SHIPPED | OUTPATIENT
Start: 2017-05-07 | End: 2017-05-31

## 2017-05-07 RX ORDER — SODIUM CHLORIDE 0.9 % (FLUSH) 0.9 %
10 SYRINGE (ML) INJECTION AS NEEDED
Status: DISCONTINUED | OUTPATIENT
Start: 2017-05-07 | End: 2017-05-07 | Stop reason: HOSPADM

## 2017-05-07 RX ADMIN — CEFAZOLIN 1 G: 1 INJECTION, POWDER, FOR SOLUTION INTRAMUSCULAR; INTRAVENOUS; PARENTERAL at 20:35

## 2017-05-09 ENCOUNTER — HOSPITAL ENCOUNTER (OUTPATIENT)
Dept: PHYSICAL THERAPY | Facility: HOSPITAL | Age: 42
Setting detail: THERAPIES SERIES
End: 2017-05-09

## 2017-05-11 ENCOUNTER — OFFICE VISIT (OUTPATIENT)
Dept: FAMILY MEDICINE CLINIC | Facility: CLINIC | Age: 42
End: 2017-05-11

## 2017-05-11 ENCOUNTER — HOSPITAL ENCOUNTER (OUTPATIENT)
Dept: PHYSICAL THERAPY | Facility: HOSPITAL | Age: 42
Setting detail: THERAPIES SERIES
End: 2017-05-11

## 2017-05-11 VITALS
DIASTOLIC BLOOD PRESSURE: 84 MMHG | SYSTOLIC BLOOD PRESSURE: 140 MMHG | HEART RATE: 117 BPM | OXYGEN SATURATION: 98 % | HEIGHT: 72 IN | WEIGHT: 314.8 LBS | BODY MASS INDEX: 42.64 KG/M2

## 2017-05-11 DIAGNOSIS — E11.8 TYPE 2 DIABETES MELLITUS WITH COMPLICATION, WITH LONG-TERM CURRENT USE OF INSULIN (HCC): Primary | ICD-10-CM

## 2017-05-11 DIAGNOSIS — M10.9 GOUT OF RIGHT WRIST, UNSPECIFIED CAUSE, UNSPECIFIED CHRONICITY: ICD-10-CM

## 2017-05-11 DIAGNOSIS — Z79.4 TYPE 2 DIABETES MELLITUS WITH COMPLICATION, WITH LONG-TERM CURRENT USE OF INSULIN (HCC): Primary | ICD-10-CM

## 2017-05-11 PROCEDURE — 99213 OFFICE O/P EST LOW 20 MIN: CPT | Performed by: FAMILY MEDICINE

## 2017-05-11 PROCEDURE — 96372 THER/PROPH/DIAG INJ SC/IM: CPT | Performed by: FAMILY MEDICINE

## 2017-05-11 RX ORDER — TRIAMCINOLONE ACETONIDE 40 MG/ML
60 INJECTION, SUSPENSION INTRA-ARTICULAR; INTRAMUSCULAR ONCE
Status: COMPLETED | OUTPATIENT
Start: 2017-05-11 | End: 2017-05-11

## 2017-05-11 RX ADMIN — TRIAMCINOLONE ACETONIDE 60 MG: 40 INJECTION, SUSPENSION INTRA-ARTICULAR; INTRAMUSCULAR at 16:45

## 2017-05-25 ENCOUNTER — OFFICE VISIT (OUTPATIENT)
Dept: ORTHOPEDIC SURGERY | Facility: CLINIC | Age: 42
End: 2017-05-25

## 2017-05-25 VITALS — BODY MASS INDEX: 42.53 KG/M2 | WEIGHT: 314 LBS | HEIGHT: 72 IN

## 2017-05-25 DIAGNOSIS — M25.521 RIGHT ELBOW PAIN: ICD-10-CM

## 2017-05-25 DIAGNOSIS — M10.9 ACUTE GOUT, UNSPECIFIED CAUSE, UNSPECIFIED SITE: Primary | ICD-10-CM

## 2017-05-25 PROCEDURE — 99203 OFFICE O/P NEW LOW 30 MIN: CPT | Performed by: NURSE PRACTITIONER

## 2017-05-25 PROCEDURE — 96372 THER/PROPH/DIAG INJ SC/IM: CPT | Performed by: NURSE PRACTITIONER

## 2017-05-25 RX ORDER — TRIAMCINOLONE ACETONIDE 40 MG/ML
60 INJECTION, SUSPENSION INTRA-ARTICULAR; INTRAMUSCULAR ONCE
Status: DISCONTINUED | OUTPATIENT
Start: 2017-05-25 | End: 2018-11-26

## 2017-05-25 RX ORDER — COLCHICINE 0.6 MG/1
TABLET ORAL
Qty: 33 TABLET | Refills: 1 | Status: SHIPPED | OUTPATIENT
Start: 2017-05-25 | End: 2017-06-23 | Stop reason: SDUPTHER

## 2017-05-31 ENCOUNTER — OFFICE VISIT (OUTPATIENT)
Dept: FAMILY MEDICINE CLINIC | Facility: CLINIC | Age: 42
End: 2017-05-31

## 2017-05-31 ENCOUNTER — LAB (OUTPATIENT)
Dept: LAB | Facility: HOSPITAL | Age: 42
End: 2017-05-31

## 2017-05-31 VITALS
HEIGHT: 72 IN | DIASTOLIC BLOOD PRESSURE: 84 MMHG | WEIGHT: 313.3 LBS | BODY MASS INDEX: 42.43 KG/M2 | OXYGEN SATURATION: 98 % | SYSTOLIC BLOOD PRESSURE: 128 MMHG | HEART RATE: 101 BPM

## 2017-05-31 DIAGNOSIS — I10 HTN (HYPERTENSION), BENIGN: ICD-10-CM

## 2017-05-31 DIAGNOSIS — Z79.4 TYPE 2 DIABETES MELLITUS WITH COMPLICATION, WITH LONG-TERM CURRENT USE OF INSULIN (HCC): ICD-10-CM

## 2017-05-31 DIAGNOSIS — K21.00 GASTROESOPHAGEAL REFLUX DISEASE WITH ESOPHAGITIS: ICD-10-CM

## 2017-05-31 DIAGNOSIS — E11.8 TYPE 2 DIABETES MELLITUS WITH COMPLICATION, WITH LONG-TERM CURRENT USE OF INSULIN (HCC): ICD-10-CM

## 2017-05-31 DIAGNOSIS — Z79.4 TYPE 2 DIABETES MELLITUS WITH COMPLICATION, WITH LONG-TERM CURRENT USE OF INSULIN (HCC): Primary | ICD-10-CM

## 2017-05-31 DIAGNOSIS — M10.429: ICD-10-CM

## 2017-05-31 DIAGNOSIS — E11.8 TYPE 2 DIABETES MELLITUS WITH COMPLICATION, WITH LONG-TERM CURRENT USE OF INSULIN (HCC): Primary | ICD-10-CM

## 2017-05-31 DIAGNOSIS — E66.01 MORBID OBESITY DUE TO EXCESS CALORIES (HCC): ICD-10-CM

## 2017-05-31 LAB
ALBUMIN UR-MCNC: 5.7 MG/L
ARTICHOKE IGE QN: 166 MG/DL (ref 1–129)
CHOLEST SERPL-MCNC: 240 MG/DL (ref 0–199)
CREAT UR-MCNC: 148.2 MG/DL
HBA1C MFR BLD: 6.66 % (ref 4–5.6)
HDLC SERPL-MCNC: 26 MG/DL (ref 60–200)
LDLC/HDLC SERPL: 5.85 {RATIO} (ref 0–3.55)
MICROALBUMIN/CREAT UR: 38.5 MG/G (ref 0–30)
TRIGL SERPL-MCNC: 310 MG/DL (ref 20–199)
TSH SERPL DL<=0.05 MIU/L-ACNC: 1.93 MIU/ML (ref 0.46–4.68)

## 2017-05-31 PROCEDURE — 83036 HEMOGLOBIN GLYCOSYLATED A1C: CPT | Performed by: FAMILY MEDICINE

## 2017-05-31 PROCEDURE — 80061 LIPID PANEL: CPT | Performed by: FAMILY MEDICINE

## 2017-05-31 PROCEDURE — 84443 ASSAY THYROID STIM HORMONE: CPT | Performed by: FAMILY MEDICINE

## 2017-05-31 PROCEDURE — 82043 UR ALBUMIN QUANTITATIVE: CPT | Performed by: FAMILY MEDICINE

## 2017-05-31 PROCEDURE — 82570 ASSAY OF URINE CREATININE: CPT | Performed by: FAMILY MEDICINE

## 2017-05-31 PROCEDURE — 99213 OFFICE O/P EST LOW 20 MIN: CPT | Performed by: FAMILY MEDICINE

## 2017-05-31 RX ORDER — LISINOPRIL 10 MG/1
10 TABLET ORAL DAILY
Qty: 30 TABLET | Refills: 0 | Status: SHIPPED | OUTPATIENT
Start: 2017-05-31 | End: 2017-07-07 | Stop reason: SDDI

## 2017-05-31 RX ORDER — METFORMIN HYDROCHLORIDE 500 MG/1
500 TABLET, EXTENDED RELEASE ORAL 2 TIMES DAILY
Qty: 60 TABLET | Refills: 2 | Status: SHIPPED | OUTPATIENT
Start: 2017-05-31 | End: 2017-09-20 | Stop reason: SDUPTHER

## 2017-05-31 RX ORDER — GABAPENTIN 600 MG/1
600 TABLET ORAL 4 TIMES DAILY
Qty: 120 TABLET | Refills: 1 | Status: SHIPPED | OUTPATIENT
Start: 2017-05-31 | End: 2017-09-11 | Stop reason: SDUPTHER

## 2017-06-01 RX ORDER — ATORVASTATIN CALCIUM 20 MG/1
20 TABLET, FILM COATED ORAL DAILY
Qty: 30 TABLET | Refills: 0 | Status: SHIPPED | OUTPATIENT
Start: 2017-06-01 | End: 2017-09-11 | Stop reason: SDUPTHER

## 2017-06-23 ENCOUNTER — LAB (OUTPATIENT)
Dept: LAB | Facility: HOSPITAL | Age: 42
End: 2017-06-23

## 2017-06-23 ENCOUNTER — OFFICE VISIT (OUTPATIENT)
Dept: ORTHOPEDIC SURGERY | Facility: CLINIC | Age: 42
End: 2017-06-23

## 2017-06-23 VITALS — WEIGHT: 315 LBS | BODY MASS INDEX: 42.66 KG/M2 | HEIGHT: 72 IN

## 2017-06-23 DIAGNOSIS — M25.521 RIGHT ELBOW PAIN: ICD-10-CM

## 2017-06-23 DIAGNOSIS — M10.9 ACUTE GOUT, UNSPECIFIED CAUSE, UNSPECIFIED SITE: ICD-10-CM

## 2017-06-23 DIAGNOSIS — M10.9 ACUTE GOUT, UNSPECIFIED CAUSE, UNSPECIFIED SITE: Primary | ICD-10-CM

## 2017-06-23 LAB
CRP SERPL-MCNC: 0.7 MG/DL (ref 0–1)
URATE SERPL-MCNC: 9.4 MG/DL (ref 2.5–8.5)

## 2017-06-23 PROCEDURE — 99214 OFFICE O/P EST MOD 30 MIN: CPT | Performed by: NURSE PRACTITIONER

## 2017-06-23 PROCEDURE — 36415 COLL VENOUS BLD VENIPUNCTURE: CPT

## 2017-06-23 PROCEDURE — 86140 C-REACTIVE PROTEIN: CPT | Performed by: NURSE PRACTITIONER

## 2017-06-23 PROCEDURE — 84550 ASSAY OF BLOOD/URIC ACID: CPT | Performed by: NURSE PRACTITIONER

## 2017-06-23 RX ORDER — COLCHICINE 0.6 MG/1
0.6 TABLET ORAL 2 TIMES DAILY
Qty: 60 TABLET | Refills: 1 | Status: SHIPPED | OUTPATIENT
Start: 2017-06-23 | End: 2017-11-08

## 2017-06-23 NOTE — PROGRESS NOTES
Jose Sal is a 42 y.o. male returns for     Chief Complaint   Patient presents with   • Right Elbow - Follow-up       HISTORY OF PRESENT ILLNESS:  Right elbow is not any better, still painful.  Pain level when moving is a 6.         CONCURRENT MEDICAL HISTORY:    Past Medical History:   Diagnosis Date   • Abdominal pain     status post laparoscopic cholecystectomy with pain in incision site   • Arthritis    • Backache    • Bipolar disorder    • Depressive disorder    • Diabetes mellitus    • Diabetes mellitus without complication     Diabetes mellitus without mention of complication, type II or unspecified type, uncontrolled    • Diabetic neuropathy    • Essential hypertension    • Generalized anxiety disorder    • Gout    • Hypertension    • Hypertensive disorder    • Kidney stone    • Low back pain    • Mood disorder    • Obesity    • Onychogryposis    • Onychomycosis    • Pain in lower limb     possible neuropathy   • Pain in toe    • Plantar fasciitis    • Radiating pain     to lumbar region of back   • Spasm of back muscles    • Type II diabetes mellitus, uncontrolled    • Ureteric stone    • Urinary tract infectious disease        No Known Allergies      Current Outpatient Prescriptions:   •  atorvastatin (LIPITOR) 20 MG tablet, Take 1 tablet by mouth Daily., Disp: 30 tablet, Rfl: 0  •  gabapentin (NEURONTIN) 600 MG tablet, Take 1 tablet by mouth 4 (Four) Times a Day., Disp: 120 tablet, Rfl: 1  •  lisinopril (PRINIVIL,ZESTRIL) 10 MG tablet, Take 1 tablet by mouth Daily., Disp: 30 tablet, Rfl: 0  •  metFORMIN ER (GLUCOPHAGE-XR) 500 MG 24 hr tablet, Take 1 tablet by mouth 2 (Two) Times a Day., Disp: 60 tablet, Rfl: 2  •  colchicine (COLCRYS) 0.6 MG tablet, Take 1 tablet by mouth 2 (Two) Times a Day., Disp: 60 tablet, Rfl: 1  •  raNITIdine (ZANTAC) 150 MG tablet, Take 150 mg by mouth Daily., Disp: , Rfl:     Current Facility-Administered Medications:   •  triamcinolone acetonide (KENALOG-40) injection  "60 mg, 60 mg, Intramuscular, Once, Ko Dyson, THANH    Past Surgical History:   Procedure Laterality Date   • CHOLECYSTECTOMY     • INJECTION OF MEDICATION  06/15/2014    Toradol (1)   • KIDNEY STONE SURGERY     • KIDNEY STONE SURGERY     • NAIL BED REMOVAL/REVISION  05/18/2015    Excision of Nail and Nail Matrix, Permanent 06750 (1)     • OTHER SURGICAL HISTORY  03/20/2014    Avulsion of nail plate   • SHOULDER SURGERY Left        ROS  No fevers or chills.  No chest pain or shortness of air.  No GI or  disturbances.    PHYSICAL EXAMINATION:       Ht 72\" (182.9 cm)  Wt (!) 319 lb (145 kg)  BMI 43.26 kg/m2    Physical Exam   Constitutional: He is oriented to person, place, and time. Vital signs are normal. He appears well-developed and well-nourished. He is cooperative.   HENT:   Head: Normocephalic and atraumatic.   Neck: Trachea normal and phonation normal.   Pulmonary/Chest: Effort normal. No respiratory distress.   Abdominal: Soft. Normal appearance. He exhibits no distension.   Neurological: He is alert and oriented to person, place, and time. GCS eye subscore is 4. GCS verbal subscore is 5. GCS motor subscore is 6.   Skin: Skin is warm, dry and intact.   Psychiatric: He has a normal mood and affect. His speech is normal and behavior is normal. Judgment and thought content normal. Cognition and memory are normal.   Vitals reviewed.      GAIT:     [x]  Normal  []  Antalgic    Assistive device: [x]  None  []  Walker     []  Crutches  []  Cane     []  Wheelchair  []  Stretcher    Right Elbow Exam     Tenderness   Right elbow tenderness location: Diffuse around the elbow and the swollen nodule noted on the posterior aspect of the olecranon process process.     Range of Motion   Extension: abnormal   Flexion: normal   Pronation: normal   Supination: normal     Muscle Strength   Pronation:  5/5   Supination:  5/5     Other   Erythema: absent  Scars: absent  Sensation: normal  Pulse: present    Comments:  " Small swollen nodule-like area noted posterior elbow over the olecranon process.      Left Elbow Exam   Left elbow exam is normal.              No results found.          ASSESSMENT:    Diagnoses and all orders for this visit:    Acute gout, unspecified cause, unspecified site  -     C-reactive Protein; Future  -     Uric Acid; Future  -     colchicine (COLCRYS) 0.6 MG tablet; Take 1 tablet by mouth 2 (Two) Times a Day.    Right elbow pain  -     C-reactive Protein; Future  -     Uric Acid; Future          PLAN  Recommend repeat uric acid and CRP to evaluate if these are trending down since starting the colchicine.  If they remain high then we'll start a twice a day dosing of colchicine and repeat the labs again in 2 weeks.  He may need referral to rheumatology for treatment of chronic gout is not responsive to basic treatment  No Follow-up on file.    Ko Dyson, THANH

## 2017-07-07 ENCOUNTER — OFFICE VISIT (OUTPATIENT)
Dept: FAMILY MEDICINE CLINIC | Facility: CLINIC | Age: 42
End: 2017-07-07

## 2017-07-07 ENCOUNTER — APPOINTMENT (OUTPATIENT)
Dept: LAB | Facility: HOSPITAL | Age: 42
End: 2017-07-07

## 2017-07-07 VITALS
HEART RATE: 80 BPM | OXYGEN SATURATION: 98 % | SYSTOLIC BLOOD PRESSURE: 138 MMHG | DIASTOLIC BLOOD PRESSURE: 80 MMHG | WEIGHT: 311.25 LBS | HEIGHT: 72 IN | BODY MASS INDEX: 42.16 KG/M2

## 2017-07-07 DIAGNOSIS — Z79.4 TYPE 2 DIABETES MELLITUS WITHOUT COMPLICATION, WITH LONG-TERM CURRENT USE OF INSULIN (HCC): ICD-10-CM

## 2017-07-07 DIAGNOSIS — E11.9 TYPE 2 DIABETES MELLITUS WITHOUT COMPLICATION, WITH LONG-TERM CURRENT USE OF INSULIN (HCC): ICD-10-CM

## 2017-07-07 DIAGNOSIS — I10 ESSENTIAL HYPERTENSION: ICD-10-CM

## 2017-07-07 DIAGNOSIS — M10.9 ARTICULAR GOUT: Primary | ICD-10-CM

## 2017-07-07 PROBLEM — K21.9 GASTROESOPHAGEAL REFLUX DISEASE WITHOUT ESOPHAGITIS: Status: ACTIVE | Noted: 2017-07-07

## 2017-07-07 LAB — URATE SERPL-MCNC: 11.1 MG/DL (ref 2.5–8.5)

## 2017-07-07 PROCEDURE — 84550 ASSAY OF BLOOD/URIC ACID: CPT | Performed by: FAMILY MEDICINE

## 2017-07-07 PROCEDURE — 99213 OFFICE O/P EST LOW 20 MIN: CPT | Performed by: FAMILY MEDICINE

## 2017-07-07 PROCEDURE — 36415 COLL VENOUS BLD VENIPUNCTURE: CPT | Performed by: FAMILY MEDICINE

## 2017-07-07 NOTE — PROGRESS NOTES
Subjective:     Jose Sal is a 42 y.o. male who presents for follow up for DM, Gout, HTN. Pt states he checks his sugars around 2 times a month and normaly is between . Last HbA1c was 6.6. Pt was recently switched off of insulin and placed on metformin 500mg BID without complications. Pt has a referral for an eye exam. Pt states he has not been taking his lisinopril for his HTN because he was becoming light headed. Pt has a history of Gout that he is seeing Dr Wan for. Pt has had gout attacks in multiple joints and does not want a referral to pain clinic. Patient currently on colchicine and had his allopurinol discontinued. Pt was to have a referral to nephrology due to history of uric acid stones and feeling of pressure on his kidneys. States he has not heard from referral so would like another. Pt does not need refills at this time.    Preventative:  Over the past 2 weeks, have you felt down, depressed, or hopeless?No   Over the past 2 weeks, have you felt little interest or pleasure in doing things?No  Clinical depression screening refused by patient.No     Past Medical Hx:  Past Medical History:   Diagnosis Date   • Abdominal pain     status post laparoscopic cholecystectomy with pain in incision site   • Arthritis    • Backache    • Bipolar disorder    • Depressive disorder    • Diabetes mellitus    • Diabetes mellitus without complication     Diabetes mellitus without mention of complication, type II or unspecified type, uncontrolled    • Diabetic neuropathy    • Essential hypertension    • Generalized anxiety disorder    • Gout    • Hypertension    • Hypertensive disorder    • Kidney stone    • Low back pain    • Mood disorder    • Obesity    • Onychogryposis    • Onychomycosis    • Pain in lower limb     possible neuropathy   • Pain in toe    • Plantar fasciitis    • Radiating pain     to lumbar region of back   • Spasm of back muscles    • Type II diabetes mellitus, uncontrolled    •  Ureteric stone    • Urinary tract infectious disease        Past Surgical Hx:  Past Surgical History:   Procedure Laterality Date   • CHOLECYSTECTOMY     • INJECTION OF MEDICATION  06/15/2014    Toradol (1)   • KIDNEY STONE SURGERY     • KIDNEY STONE SURGERY     • NAIL BED REMOVAL/REVISION  05/18/2015    Excision of Nail and Nail Matrix, Permanent 64840 (1)     • OTHER SURGICAL HISTORY  03/20/2014    Avulsion of nail plate   • SHOULDER SURGERY Left        Health Maintenance:  Health Maintenance   Topic Date Due   • MEDICARE ANNUAL WELLNESS  04/06/2017   • DIABETIC EYE EXAM  05/11/2017   • INFLUENZA VACCINE  08/01/2017   • HEMOGLOBIN A1C  11/30/2017   • LIPID PANEL  05/31/2018   • URINE MICROALBUMIN  05/31/2018   • DIABETIC FOOT EXAM  07/07/2018   • TDAP/TD VACCINES (2 - Td) 04/09/2025   • PNEUMOCOCCAL VACCINE (19-64 MEDIUM RISK)  Completed       Current Meds:    Current Outpatient Prescriptions:   •  atorvastatin (LIPITOR) 20 MG tablet, Take 1 tablet by mouth Daily., Disp: 30 tablet, Rfl: 0  •  colchicine (COLCRYS) 0.6 MG tablet, Take 1 tablet by mouth 2 (Two) Times a Day., Disp: 60 tablet, Rfl: 1  •  gabapentin (NEURONTIN) 600 MG tablet, Take 1 tablet by mouth 4 (Four) Times a Day., Disp: 120 tablet, Rfl: 1  •  metFORMIN ER (GLUCOPHAGE-XR) 500 MG 24 hr tablet, Take 1 tablet by mouth 2 (Two) Times a Day., Disp: 60 tablet, Rfl: 2  •  raNITIdine (ZANTAC) 150 MG tablet, Take 150 mg by mouth Daily., Disp: , Rfl:     Current Facility-Administered Medications:   •  triamcinolone acetonide (KENALOG-40) injection 60 mg, 60 mg, Intramuscular, Once, THANH Sampson    Allergies:  Review of patient's allergies indicates no known allergies.    Family Hx:  No family history on file.     Social History:  Social History     Social History   • Marital status:      Spouse name: N/A   • Number of children: N/A   • Years of education: N/A     Occupational History   • Not on file.     Social History Main Topics   •  "Smoking status: Former Smoker   • Smokeless tobacco: Current User   • Alcohol use Yes      Comment: occasional   • Drug use: No   • Sexual activity: Defer     Other Topics Concern   • Not on file     Social History Narrative       Review of Systems  Review of Systems   Constitutional: Negative for appetite change, chills, fatigue and fever.   HENT: Negative for congestion, hearing loss, nosebleeds, sore throat, tinnitus and voice change.    Eyes: Negative for pain, discharge, redness, itching and visual disturbance.   Respiratory: Negative for cough, chest tightness, shortness of breath, wheezing and stridor.    Cardiovascular: Negative for chest pain, palpitations and leg swelling.   Gastrointestinal: Negative for abdominal pain, blood in stool, constipation, diarrhea, nausea and vomiting.   Endocrine: Negative for cold intolerance and heat intolerance.   Genitourinary: Negative for dysuria, flank pain and hematuria.   Musculoskeletal: Positive for arthralgias (gout). Negative for back pain, myalgias and neck stiffness.   Skin: Negative for rash and wound.   Neurological: Negative for dizziness, numbness and headaches.   Psychiatric/Behavioral: Negative for hallucinations, self-injury, sleep disturbance and suicidal ideas.         Objective:     /80 (BP Location: Left arm, Patient Position: Sitting, Cuff Size: Adult)  Pulse 80  Ht 72\" (182.9 cm)  Wt (!) 311 lb 4 oz (141 kg)  SpO2 98%  BMI 42.21 kg/m2        Physical Exam   Constitutional: He is oriented to person, place, and time. He appears well-developed and well-nourished. No distress.   Obese BMI 42.2   HENT:   Head: Normocephalic and atraumatic.   Right Ear: External ear normal.   Left Ear: External ear normal.   Nose: Nose normal.   Mouth/Throat: Oropharynx is clear and moist.   Eyes: Conjunctivae and EOM are normal. Pupils are equal, round, and reactive to light. Right eye exhibits no discharge. Left eye exhibits no discharge. No scleral icterus. "   Neck: Normal range of motion. Neck supple. No tracheal deviation present. No thyromegaly present.   Cardiovascular: Normal rate, regular rhythm, normal heart sounds and intact distal pulses.  Exam reveals no gallop and no friction rub.    No murmur heard.  Pulmonary/Chest: Effort normal and breath sounds normal. No stridor. No respiratory distress. He has no wheezes. He has no rales.   Abdominal: Soft. Bowel sounds are normal. He exhibits no distension and no mass. There is no tenderness. There is no rebound and no guarding. No hernia.   Musculoskeletal: Normal range of motion. He exhibits no edema, tenderness or deformity.    Jose had a diabetic foot exam performed today.   During the foot exam he had a monofilament test performed.    Neurological Sensory Findings -  Unaltered sharp/dull right ankle/foot discrimination and unaltered sharp/dull left ankle/foot discrimination. No right ankle/foot altered proprioception and no left ankle/foot altered proprioception    Vascular Status -  His exam exhibits right foot vasculature normal. His exam exhibits no right foot edema. His exam exhibits left foot vasculature normal. His exam exhibits no left foot edema.  Neurological: He is alert and oriented to person, place, and time. No cranial nerve deficit. He exhibits normal muscle tone.   Skin: Skin is warm and dry. No rash noted. He is not diaphoretic. No erythema. No pallor.   Psychiatric: He has a normal mood and affect. His behavior is normal. Judgment and thought content normal.   Nursing note and vitals reviewed.                                                               Assessment/Plan:     Diagnoses and all orders for this visit:    Articular gout  -     Ambulatory Referral to Nephrology  -     Uric Acid    Essential hypertension        -     Discontinue Lisinopril due to non-compliance    Type 2 diabetes mellitus without complication, with long-term current use of insulin       -      Continue Metformin 500mg  BID       Follow-up:     Return in about 2 months (around 9/7/2017) for Next scheduled follow up DM.        GOALS:  Maintain medication compliance    Preventative:  Male Preventative: Cholesterol screening up to date  Vaccines:   Tetanus vaccine: up to date  Annual influenza vaccine: not up to date - declined   Pneumococcal vaccine: up to date    Former smoker  occasional/rare  eat more fruits and vegetables, decrease soda or juice intake, increase water intake and increase physical activity    RISK SCORE: 3    Robert Quarles MD PGY2  [place] [address]  Office: [phone]      This document has been electronically signed by Robert Quarles MD on July 7, 2017 2:20 PM

## 2017-07-11 NOTE — PROGRESS NOTES
I have reviewed the notes, assessments, and/or procedures performed. I concur with her/his documentation of Jose Sal.     Miguel A Gaspar, DO

## 2017-07-13 ENCOUNTER — TELEPHONE (OUTPATIENT)
Dept: FAMILY MEDICINE CLINIC | Facility: CLINIC | Age: 42
End: 2017-07-13

## 2017-07-14 ENCOUNTER — TELEPHONE (OUTPATIENT)
Dept: FAMILY MEDICINE CLINIC | Facility: CLINIC | Age: 42
End: 2017-07-14

## 2017-08-03 NOTE — TELEPHONE ENCOUNTER
PT CALLED SAID THAT THEY HAVE CALLED SEVERAL TIMES WANTING TO GET MOST RECENT URIC ACID LAB RESULTS.     PLEASE CALL PT AS SOON AS POSSIBLE [phone]

## 2017-08-04 ENCOUNTER — OFFICE VISIT (OUTPATIENT)
Dept: FAMILY MEDICINE CLINIC | Facility: CLINIC | Age: 42
End: 2017-08-04

## 2017-08-04 ENCOUNTER — APPOINTMENT (OUTPATIENT)
Dept: LAB | Facility: HOSPITAL | Age: 42
End: 2017-08-04

## 2017-08-04 VITALS
SYSTOLIC BLOOD PRESSURE: 126 MMHG | BODY MASS INDEX: 41.46 KG/M2 | HEART RATE: 110 BPM | WEIGHT: 306.13 LBS | DIASTOLIC BLOOD PRESSURE: 78 MMHG | HEIGHT: 72 IN | OXYGEN SATURATION: 97 %

## 2017-08-04 DIAGNOSIS — E11.9 TYPE 2 DIABETES MELLITUS WITHOUT COMPLICATION, WITH LONG-TERM CURRENT USE OF INSULIN (HCC): Primary | ICD-10-CM

## 2017-08-04 DIAGNOSIS — M25.512 ACUTE PAIN OF LEFT SHOULDER: ICD-10-CM

## 2017-08-04 DIAGNOSIS — Z79.4 TYPE 2 DIABETES MELLITUS WITHOUT COMPLICATION, WITH LONG-TERM CURRENT USE OF INSULIN (HCC): Primary | ICD-10-CM

## 2017-08-04 LAB — HBA1C MFR BLD: 6.2 % (ref 4–5.6)

## 2017-08-04 PROCEDURE — 99213 OFFICE O/P EST LOW 20 MIN: CPT | Performed by: FAMILY MEDICINE

## 2017-08-04 PROCEDURE — 36415 COLL VENOUS BLD VENIPUNCTURE: CPT | Performed by: FAMILY MEDICINE

## 2017-08-04 PROCEDURE — 83036 HEMOGLOBIN GLYCOSYLATED A1C: CPT | Performed by: FAMILY MEDICINE

## 2017-08-04 RX ORDER — OXYCODONE HYDROCHLORIDE AND ACETAMINOPHEN 5; 325 MG/1; MG/1
1 TABLET ORAL EVERY 6 HOURS PRN
Qty: 10 TABLET | Refills: 0 | Status: SHIPPED | OUTPATIENT
Start: 2017-08-04 | End: 2017-09-11

## 2017-08-04 NOTE — PROGRESS NOTES
Subjective:     Jose Sal is a 42 y.o. male who presents for follow up for DMII. Pt states sugars have been running around 100 and he checks them 3-4 times a week. Pt states no refills needed at this time. Pt complains acute shoulder pain and decrease in range of motion. Pt states he had a rotator cuff repair procedure with Dr. Pineda in [place] on March 30th 2017. Pt states he had been cleared by physical therapy for normal activity. Yesterday while lifting a minifridge onto the back of his truck he felt acute onset of sharp burning pain in his left shoulder and limited range of motion. Pt states range of motion and pain similar to symptoms before his previous procedure. Pt agreeable to XR imaging, HbA1c and referral to orthopedic surgery.    Preventative:  Over the past 2 weeks, have you felt down, depressed, or hopeless?No   Over the past 2 weeks, have you felt little interest or pleasure in doing things?No  Clinical depression screening refused by patient.No     Past Medical Hx:  Past Medical History:   Diagnosis Date   • Abdominal pain     status post laparoscopic cholecystectomy with pain in incision site   • Arthritis    • Backache    • Bipolar disorder    • Depressive disorder    • Diabetes mellitus    • Diabetes mellitus without complication     Diabetes mellitus without mention of complication, type II or unspecified type, uncontrolled    • Diabetic neuropathy    • Essential hypertension    • Generalized anxiety disorder    • Gout    • Hypertension    • Hypertensive disorder    • Kidney stone    • Low back pain    • Mood disorder    • Obesity    • Onychogryposis    • Onychomycosis    • Pain in lower limb     possible neuropathy   • Pain in toe    • Plantar fasciitis    • Radiating pain     to lumbar region of back   • Spasm of back muscles    • Type II diabetes mellitus, uncontrolled    • Ureteric stone    • Urinary tract infectious disease        Past Surgical Hx:  Past Surgical History:    Procedure Laterality Date   • CHOLECYSTECTOMY     • INJECTION OF MEDICATION  06/15/2014    Toradol (1)   • KIDNEY STONE SURGERY     • KIDNEY STONE SURGERY     • NAIL BED REMOVAL/REVISION  05/18/2015    Excision of Nail and Nail Matrix, Permanent 02811 (1)     • OTHER SURGICAL HISTORY  03/20/2014    Avulsion of nail plate   • SHOULDER SURGERY Left        Health Maintenance:  Health Maintenance   Topic Date Due   • MEDICARE ANNUAL WELLNESS  04/06/2017   • DIABETIC EYE EXAM  05/11/2017   • INFLUENZA VACCINE  09/01/2017   • HEMOGLOBIN A1C  11/30/2017   • LIPID PANEL  05/31/2018   • URINE MICROALBUMIN  05/31/2018   • DIABETIC FOOT EXAM  07/07/2018   • TDAP/TD VACCINES (2 - Td) 04/09/2025   • PNEUMOCOCCAL VACCINE (19-64 MEDIUM RISK)  Completed       Current Meds:    Current Outpatient Prescriptions:   •  atorvastatin (LIPITOR) 20 MG tablet, Take 1 tablet by mouth Daily., Disp: 30 tablet, Rfl: 0  •  colchicine (COLCRYS) 0.6 MG tablet, Take 1 tablet by mouth 2 (Two) Times a Day., Disp: 60 tablet, Rfl: 1  •  gabapentin (NEURONTIN) 600 MG tablet, Take 1 tablet by mouth 4 (Four) Times a Day., Disp: 120 tablet, Rfl: 1  •  metFORMIN ER (GLUCOPHAGE-XR) 500 MG 24 hr tablet, Take 1 tablet by mouth 2 (Two) Times a Day., Disp: 60 tablet, Rfl: 2  •  oxyCODONE-acetaminophen (PERCOCET) 5-325 MG per tablet, Take 1 tablet by mouth Every 6 (Six) Hours As Needed for Severe Pain  (7-10)., Disp: 10 tablet, Rfl: 0  •  raNITIdine (ZANTAC) 150 MG tablet, Take 150 mg by mouth Daily., Disp: , Rfl:     Current Facility-Administered Medications:   •  triamcinolone acetonide (KENALOG-40) injection 60 mg, 60 mg, Intramuscular, Once, THANH Sampson    Allergies:  Review of patient's allergies indicates no known allergies.    Family Hx:  No family history on file.     Social History:  Social History     Social History   • Marital status:      Spouse name: N/A   • Number of children: N/A   • Years of education: N/A     Occupational  "History   • Not on file.     Social History Main Topics   • Smoking status: Former Smoker   • Smokeless tobacco: Current User   • Alcohol use Yes      Comment: occasional   • Drug use: No   • Sexual activity: Defer     Other Topics Concern   • Not on file     Social History Narrative       Review of Systems  Review of Systems   Constitutional: Negative for chills and fever.   HENT: Negative for congestion and sore throat.    Eyes: Negative for pain and visual disturbance.   Respiratory: Negative for cough, chest tightness, shortness of breath and wheezing.    Cardiovascular: Negative for chest pain, palpitations and leg swelling.   Gastrointestinal: Negative for abdominal pain, diarrhea and vomiting.   Endocrine: Negative for cold intolerance and heat intolerance.   Genitourinary: Negative for dysuria, flank pain and hematuria.   Musculoskeletal: Positive for arthralgias (gout, decrease ROM left shoulder and tenderness). Negative for back pain, myalgias and neck stiffness.   Skin: Negative for rash and wound.   Neurological: Negative for seizures and syncope.   Psychiatric/Behavioral: Negative for agitation and confusion.         Objective:     /78  Pulse 110  Ht 72\" (182.9 cm)  Wt (!) 306 lb 2 oz (139 kg)  SpO2 97%  BMI 41.52 kg/m2  Physical Exam   Constitutional: He is oriented to person, place, and time. He appears well-developed and well-nourished. No distress.   Obese BMI 42.2   HENT:   Head: Normocephalic and atraumatic.   Right Ear: External ear normal.   Left Ear: External ear normal.   Nose: Nose normal.   Mouth/Throat: Oropharynx is clear and moist.   Eyes: Conjunctivae and EOM are normal. Pupils are equal, round, and reactive to light. Right eye exhibits no discharge. Left eye exhibits no discharge. No scleral icterus.   Neck: Normal range of motion. Neck supple. No tracheal deviation present. No thyromegaly present.   Cardiovascular: Normal rate, regular rhythm and normal heart sounds.  "   Pulmonary/Chest: Effort normal and breath sounds normal. No stridor. No respiratory distress. He has no wheezes. He has no rales.   Abdominal: Soft. Bowel sounds are normal. He exhibits no distension. There is no tenderness.   Musculoskeletal: He exhibits tenderness (on passive motion of left shoulder). He exhibits no edema or deformity.        Arms:  Neurological: He is alert and oriented to person, place, and time. No cranial nerve deficit. He exhibits normal muscle tone.   Skin: Skin is warm and dry. He is not diaphoretic.   Psychiatric: He has a normal mood and affect. His behavior is normal. Judgment and thought content normal.   Nursing note and vitals reviewed.                                                                     Assessment/Plan:     Diagnoses and all orders for this visit:    Type 2 diabetes mellitus without complication, with long-term current use of insulin  -     Hemoglobin A1c    Acute pain of left shoulder  -     XR Shoulder 2+ View Left; Future  -     Ambulatory Referral to Orthopedic Surgery  -     oxyCODONE-acetaminophen (PERCOCET) 5-325 MG per tablet; Take 1 tablet by mouth Every 6 (Six) Hours As Needed for Severe Pain  (7-10).      Follow-up:     Return in about 3 months (around 11/4/2017) for Next scheduled follow up DM.        GOALS:  Maintain medication compliance    Preventative:  Male Preventative: Cholesterol screening up to date  Vaccines:   Tetanus vaccine: up to date  Annual influenza vaccine: up to date   Pneumococcal vaccine: up to date    former smoker  occasional/rare  eat more fruits and vegetables, decrease soda or juice intake, increase water intake and increase physical activity    RISK SCORE: 3    Robert Quarles MD PGY2  [place] [address]  Office: [phone]      This document has been electronically signed by Robert Quarles MD on August 4, 2017 3:13 PM

## 2017-08-15 ENCOUNTER — TELEPHONE (OUTPATIENT)
Dept: GENERAL RADIOLOGY | Facility: HOSPITAL | Age: 42
End: 2017-08-15

## 2017-09-05 ENCOUNTER — TELEPHONE (OUTPATIENT)
Dept: FAMILY MEDICINE CLINIC | Facility: CLINIC | Age: 42
End: 2017-09-05

## 2017-09-05 NOTE — TELEPHONE ENCOUNTER
PT requesting a refill of: GABAPENTIN    MARGARET CHIN sent a fax over as well.    Please give PT a call to come pick this up when it is ready or if there is any issue refilling this prescription.    Thanks.

## 2017-09-05 NOTE — TELEPHONE ENCOUNTER
DR HOOPER-PT NEEDS REFILL OF HIS GABAPENTIN, PT IS CURRENTLY OUT OF THIS MEDICATION AS OF Saturday 9-2-17

## 2017-09-11 ENCOUNTER — APPOINTMENT (OUTPATIENT)
Dept: LAB | Facility: HOSPITAL | Age: 42
End: 2017-09-11

## 2017-09-11 ENCOUNTER — OFFICE VISIT (OUTPATIENT)
Dept: FAMILY MEDICINE CLINIC | Facility: CLINIC | Age: 42
End: 2017-09-11

## 2017-09-11 VITALS
OXYGEN SATURATION: 98 % | WEIGHT: 305.06 LBS | BODY MASS INDEX: 40.43 KG/M2 | DIASTOLIC BLOOD PRESSURE: 82 MMHG | HEIGHT: 73 IN | HEART RATE: 108 BPM | SYSTOLIC BLOOD PRESSURE: 128 MMHG

## 2017-09-11 DIAGNOSIS — Z51.81 THERAPEUTIC DRUG MONITORING: Primary | ICD-10-CM

## 2017-09-11 DIAGNOSIS — E78.2 MULTIPLE-TYPE HYPERLIPIDEMIA: ICD-10-CM

## 2017-09-11 DIAGNOSIS — E11.42 DIABETIC POLYNEUROPATHY ASSOCIATED WITH TYPE 2 DIABETES MELLITUS (HCC): ICD-10-CM

## 2017-09-11 PROCEDURE — 99213 OFFICE O/P EST LOW 20 MIN: CPT | Performed by: FAMILY MEDICINE

## 2017-09-11 PROCEDURE — 80307 DRUG TEST PRSMV CHEM ANLYZR: CPT | Performed by: FAMILY MEDICINE

## 2017-09-11 PROCEDURE — G0483 DRUG TEST DEF 22+ CLASSES: HCPCS | Performed by: FAMILY MEDICINE

## 2017-09-11 RX ORDER — GABAPENTIN 600 MG/1
600 TABLET ORAL 4 TIMES DAILY
Qty: 120 TABLET | Refills: 1 | Status: SHIPPED | OUTPATIENT
Start: 2017-09-11 | End: 2017-11-08 | Stop reason: SDUPTHER

## 2017-09-11 NOTE — PROGRESS NOTES
Subjective:     Jose Sal is a 42 y.o. male who presents for follow up for diabetic polyneuropathy. Chintan [id number] obtained and consistent with reported usage. Pt is requesting a refill of his gabapentin. Pt takes 600mg tab 4 times a day. Pt has signed a drug contract and is agreeable to a UDS at this time. Discussed increasing pts lipitor to 40mg daily due to ascvd risk score. PT has no other current complaints. Pt does not need any other refills.    Preventative:  Over the past 2 weeks, have you felt down, depressed, or hopeless?No   Over the past 2 weeks, have you felt little interest or pleasure in doing things?No  Clinical depression screening refused by patient.No     Past Medical Hx:  Past Medical History:   Diagnosis Date   • Abdominal pain     status post laparoscopic cholecystectomy with pain in incision site   • Arthritis    • Backache    • Bipolar disorder    • Depressive disorder    • Diabetes mellitus    • Diabetes mellitus without complication     Diabetes mellitus without mention of complication, type II or unspecified type, uncontrolled    • Diabetic neuropathy    • Essential hypertension    • Generalized anxiety disorder    • Gout    • Hypertension    • Hypertensive disorder    • Kidney stone    • Low back pain    • Mood disorder    • Obesity    • Onychogryposis    • Onychomycosis    • Pain in lower limb     possible neuropathy   • Pain in toe    • Plantar fasciitis    • Radiating pain     to lumbar region of back   • Spasm of back muscles    • Type II diabetes mellitus, uncontrolled    • Ureteric stone    • Urinary tract infectious disease        Past Surgical Hx:  Past Surgical History:   Procedure Laterality Date   • CHOLECYSTECTOMY     • INJECTION OF MEDICATION  06/15/2014    Toradol (1)   • KIDNEY STONE SURGERY     • KIDNEY STONE SURGERY     • NAIL BED REMOVAL/REVISION  05/18/2015    Excision of Nail and Nail Matrix, Permanent 13345 (1)     • OTHER SURGICAL HISTORY  03/20/2014     Avulsion of nail plate   • SHOULDER SURGERY Left        Health Maintenance:  Health Maintenance   Topic Date Due   • MEDICARE ANNUAL WELLNESS  04/06/2017   • DIABETIC EYE EXAM  05/11/2017   • INFLUENZA VACCINE  08/01/2017   • HEMOGLOBIN A1C  02/04/2018   • LIPID PANEL  05/31/2018   • URINE MICROALBUMIN  05/31/2018   • DIABETIC FOOT EXAM  08/04/2018   • TDAP/TD VACCINES (2 - Td) 04/09/2025   • PNEUMOCOCCAL VACCINE (19-64 MEDIUM RISK)  Completed       Current Meds:    Current Outpatient Prescriptions:   •  atorvastatin (LIPITOR) 20 MG tablet, Take 1 tablet by mouth Daily., Disp: 30 tablet, Rfl: 0  •  colchicine (COLCRYS) 0.6 MG tablet, Take 1 tablet by mouth 2 (Two) Times a Day., Disp: 60 tablet, Rfl: 1  •  metFORMIN ER (GLUCOPHAGE-XR) 500 MG 24 hr tablet, Take 1 tablet by mouth 2 (Two) Times a Day., Disp: 60 tablet, Rfl: 2  •  raNITIdine (ZANTAC) 150 MG tablet, Take 150 mg by mouth Daily., Disp: , Rfl:   •  gabapentin (NEURONTIN) 600 MG tablet, Take 1 tablet by mouth 4 (Four) Times a Day., Disp: 120 tablet, Rfl: 1    Current Facility-Administered Medications:   •  triamcinolone acetonide (KENALOG-40) injection 60 mg, 60 mg, Intramuscular, Once, THANH Sampson    Allergies:  Review of patient's allergies indicates no known allergies.    Family Hx:  No family history on file.     Social History:  Social History     Social History   • Marital status:      Spouse name: N/A   • Number of children: N/A   • Years of education: N/A     Occupational History   • Not on file.     Social History Main Topics   • Smoking status: Former Smoker   • Smokeless tobacco: Current User   • Alcohol use Yes      Comment: occasional   • Drug use: No   • Sexual activity: Defer     Other Topics Concern   • Not on file     Social History Narrative       Review of Systems  Review of Systems   Constitutional: Negative for chills and fever.   HENT: Negative for congestion and sore throat.    Eyes: Negative for pain and visual  "disturbance.   Respiratory: Negative for cough, chest tightness, shortness of breath and wheezing.    Cardiovascular: Negative for chest pain, palpitations and leg swelling.   Gastrointestinal: Negative for abdominal pain, diarrhea and vomiting.   Endocrine: Negative for cold intolerance and heat intolerance.   Genitourinary: Negative for dysuria, flank pain and hematuria.   Musculoskeletal: Positive for arthralgias (gout). Negative for back pain, myalgias and neck stiffness.   Skin: Negative for rash and wound.   Neurological: Negative for seizures and syncope.   Psychiatric/Behavioral: Negative for agitation and confusion.         Objective:     /82  Pulse 108  Ht 73\" (185.4 cm)  Wt (!) 305 lb 1 oz (138 kg)  SpO2 98%  BMI 40.25 kg/m2  Physical Exam   Constitutional: He is oriented to person, place, and time. He appears well-developed and well-nourished. No distress.   Obese BMI 41.5   HENT:   Head: Normocephalic and atraumatic.   Right Ear: External ear normal.   Left Ear: External ear normal.   Nose: Nose normal.   Mouth/Throat: Oropharynx is clear and moist.   Eyes: Conjunctivae and EOM are normal. Pupils are equal, round, and reactive to light. Right eye exhibits no discharge. Left eye exhibits no discharge. No scleral icterus.   Neck: Normal range of motion. Neck supple. No tracheal deviation present. No thyromegaly present.   Cardiovascular: Normal rate, regular rhythm and normal heart sounds.    Pulmonary/Chest: Effort normal and breath sounds normal. No stridor. No respiratory distress. He has no wheezes. He has no rales.   Abdominal: Soft. Bowel sounds are normal. He exhibits no distension. There is no tenderness.   Musculoskeletal: He exhibits no edema or deformity.   Neurological: He is alert and oriented to person, place, and time. No cranial nerve deficit. He exhibits normal muscle tone.   Skin: Skin is warm and dry. He is not diaphoretic.   Psychiatric: He has a normal mood and affect. His " behavior is normal. Judgment and thought content normal.   Nursing note and vitals reviewed.                                                                     Assessment/Plan:     Diagnoses and all orders for this visit:    Therapeutic drug monitoring  -     gabapentin (NEURONTIN) 600 MG tablet; Take 1 tablet by mouth 4 (Four) Times a Day.  -     Compliance Drug Analysis, Ur    Diabetic polyneuropathy associated with type 2 diabetes mellitus  -     gabapentin (NEURONTIN) 600 MG tablet; Take 1 tablet by mouth 4 (Four) Times a Day.  -     Compliance Drug Analysis, Ur    Multiple-type hyperlipidemia  -     atorvastatin (LIPITOR) 20 MG tablet; Take 2 tablets by mouth Daily.         Follow-up:     Return in about 2 months (around 11/11/2017) for Next scheduled follow up DM.        GOALS:  Maintain medication compliance    Preventative:  Male Preventative: Cholesterol screening up to date  Vaccines:   Tetanus vaccine: up to date  Annual influenza vaccine: not up to date - declined at this time   Pneumococcal vaccine: up to date    former smoker  occasional/rare  eat more fruits and vegetables, decrease soda or juice intake, increase water intake and increase physical activity    RISK SCORE: 3    Robert Quarles MD PGY2  [place] [address]  Office: [phone]      This document has been electronically signed by Robert Quarles MD on September 11, 2017 4:23 PM

## 2017-09-13 RX ORDER — ATORVASTATIN CALCIUM 20 MG/1
40 TABLET, FILM COATED ORAL DAILY
Qty: 60 TABLET | Refills: 6 | Status: SHIPPED | OUTPATIENT
Start: 2017-09-13 | End: 2018-09-27

## 2017-09-13 NOTE — PROGRESS NOTES
I have reviewed the notes, assessments, and/or procedures performed by Robert Quarles MD , I concur with her/his documentation of Jose Sal.

## 2017-09-18 LAB — CONV REPORT SUMMARY: NORMAL

## 2017-09-20 ENCOUNTER — TELEPHONE (OUTPATIENT)
Dept: FAMILY MEDICINE CLINIC | Facility: CLINIC | Age: 42
End: 2017-09-20

## 2017-09-20 RX ORDER — METFORMIN HYDROCHLORIDE 500 MG/1
500 TABLET, EXTENDED RELEASE ORAL 2 TIMES DAILY
Qty: 60 TABLET | Refills: 2 | Status: SHIPPED | OUTPATIENT
Start: 2017-09-20 | End: 2017-12-26 | Stop reason: SDUPTHER

## 2017-09-20 NOTE — TELEPHONE ENCOUNTER
PT is requesting a refill of: METFORMIN    Sent to: [facility]ARG[facility]    Please send this at your earliest convenience or let PT know if there is any issues filling this Prescription.    Thanks.

## 2017-11-06 ENCOUNTER — LAB (OUTPATIENT)
Dept: LAB | Facility: HOSPITAL | Age: 42
End: 2017-11-06

## 2017-11-06 DIAGNOSIS — E11.8 TYPE 2 DIABETES MELLITUS WITH COMPLICATION, WITH LONG-TERM CURRENT USE OF INSULIN (HCC): ICD-10-CM

## 2017-11-06 DIAGNOSIS — Z79.4 TYPE 2 DIABETES MELLITUS WITH COMPLICATION, WITH LONG-TERM CURRENT USE OF INSULIN (HCC): ICD-10-CM

## 2017-11-06 PROCEDURE — 83036 HEMOGLOBIN GLYCOSYLATED A1C: CPT | Performed by: FAMILY MEDICINE

## 2017-11-07 LAB — HBA1C MFR BLD: 7 % (ref 4–5.6)

## 2017-11-08 ENCOUNTER — OFFICE VISIT (OUTPATIENT)
Dept: FAMILY MEDICINE CLINIC | Facility: CLINIC | Age: 42
End: 2017-11-08

## 2017-11-08 VITALS
BODY MASS INDEX: 41.75 KG/M2 | HEIGHT: 73 IN | SYSTOLIC BLOOD PRESSURE: 128 MMHG | DIASTOLIC BLOOD PRESSURE: 80 MMHG | WEIGHT: 315 LBS | OXYGEN SATURATION: 98 % | HEART RATE: 87 BPM

## 2017-11-08 DIAGNOSIS — E11.42 DIABETIC POLYNEUROPATHY ASSOCIATED WITH TYPE 2 DIABETES MELLITUS (HCC): ICD-10-CM

## 2017-11-08 DIAGNOSIS — E11.42 TYPE 2 DIABETES MELLITUS WITH DIABETIC POLYNEUROPATHY, WITH LONG-TERM CURRENT USE OF INSULIN (HCC): Primary | ICD-10-CM

## 2017-11-08 DIAGNOSIS — Z79.4 TYPE 2 DIABETES MELLITUS WITH DIABETIC POLYNEUROPATHY, WITH LONG-TERM CURRENT USE OF INSULIN (HCC): Primary | ICD-10-CM

## 2017-11-08 PROCEDURE — 99213 OFFICE O/P EST LOW 20 MIN: CPT | Performed by: FAMILY MEDICINE

## 2017-11-08 RX ORDER — GABAPENTIN 600 MG/1
600 TABLET ORAL 4 TIMES DAILY
Qty: 120 TABLET | Refills: 2 | Status: SHIPPED | OUTPATIENT
Start: 2017-11-08 | End: 2018-02-21 | Stop reason: SDUPTHER

## 2017-11-08 RX ORDER — RANITIDINE 150 MG/1
150 TABLET ORAL DAILY
Qty: 30 TABLET | Refills: 3 | Status: SHIPPED | OUTPATIENT
Start: 2017-11-08 | End: 2018-06-11 | Stop reason: SDUPTHER

## 2017-11-08 NOTE — PROGRESS NOTES
Subjective:     Jose Sal is a 42 y.o. male who presents for follow up for DM and peripheral neuropathy. Chintan [id number] obtained and consistent with reported. Pt states he has been checking his sugars around every 2 weeks when he has been getting steroids for his gout attacks. Pts sugars have been in 3-400s. Pt states normally around 140s. Pt had an HbA1c 2 days ago and was 7.0. Pt currently on Metformin 500mg BID. Pt is agreeable to referral for diabetic eye exam. Pt requesting diabetic shoes. Pt requesting a refill of gabapentin. Pt has no other current complaints.    Preventative:  Over the past 2 weeks, have you felt down, depressed, or hopeless?No   Over the past 2 weeks, have you felt little interest or pleasure in doing things?No  Clinical depression screening refused by patient.No     Past Medical Hx:  Past Medical History:   Diagnosis Date   • Abdominal pain     status post laparoscopic cholecystectomy with pain in incision site   • Arthritis    • Backache    • Bipolar disorder    • Depressive disorder    • Diabetes mellitus    • Diabetes mellitus without complication     Diabetes mellitus without mention of complication, type II or unspecified type, uncontrolled    • Diabetic neuropathy    • Essential hypertension    • Generalized anxiety disorder    • Gout    • Hypertension    • Hypertensive disorder    • Kidney stone    • Low back pain    • Mood disorder    • Obesity    • Onychogryposis    • Onychomycosis    • Pain in lower limb     possible neuropathy   • Pain in toe    • Plantar fasciitis    • Radiating pain     to lumbar region of back   • Spasm of back muscles    • Type II diabetes mellitus, uncontrolled    • Ureteric stone    • Urinary tract infectious disease        Past Surgical Hx:  Past Surgical History:   Procedure Laterality Date   • CHOLECYSTECTOMY     • INJECTION OF MEDICATION  06/15/2014    Toradol (1)   • KIDNEY STONE SURGERY     • KIDNEY STONE SURGERY     • NAIL BED  REMOVAL/REVISION  05/18/2015    Excision of Nail and Nail Matrix, Permanent 52102 (1)     • OTHER SURGICAL HISTORY  03/20/2014    Avulsion of nail plate   • SHOULDER SURGERY Left        Health Maintenance:  Health Maintenance   Topic Date Due   • MEDICARE ANNUAL WELLNESS  04/06/2017   • DIABETIC EYE EXAM  05/11/2017   • INFLUENZA VACCINE  08/01/2017   • HEMOGLOBIN A1C  05/06/2018   • LIPID PANEL  05/31/2018   • URINE MICROALBUMIN  05/31/2018   • DIABETIC FOOT EXAM  08/04/2018   • TDAP/TD VACCINES (2 - Td) 04/09/2025   • PNEUMOCOCCAL VACCINE (19-64 MEDIUM RISK)  Completed       Current Meds:    Current Outpatient Prescriptions:   •  atorvastatin (LIPITOR) 20 MG tablet, Take 2 tablets by mouth Daily., Disp: 60 tablet, Rfl: 6  •  metFORMIN ER (GLUCOPHAGE-XR) 500 MG 24 hr tablet, Take 1 tablet by mouth 2 (Two) Times a Day., Disp: 60 tablet, Rfl: 2  •  raNITIdine (ZANTAC) 150 MG tablet, Take 1 tablet by mouth Daily., Disp: 30 tablet, Rfl: 3  •  gabapentin (NEURONTIN) 600 MG tablet, Take 1 tablet by mouth 4 (Four) Times a Day., Disp: 120 tablet, Rfl: 2    Current Facility-Administered Medications:   •  triamcinolone acetonide (KENALOG-40) injection 60 mg, 60 mg, Intramuscular, Once, THANH Sampson    Allergies:  Review of patient's allergies indicates no known allergies.    Family Hx:  No family history on file.     Social History:  Social History     Social History   • Marital status:      Spouse name: N/A   • Number of children: N/A   • Years of education: N/A     Occupational History   • Not on file.     Social History Main Topics   • Smoking status: Former Smoker   • Smokeless tobacco: Current User   • Alcohol use Yes      Comment: occasional   • Drug use: No   • Sexual activity: Defer     Other Topics Concern   • Not on file     Social History Narrative       Review of Systems  Review of Systems   Constitutional: Negative for chills and fever.   HENT: Negative for congestion and sore throat.    Eyes:  "Negative for pain and visual disturbance.   Respiratory: Negative for cough, shortness of breath and wheezing.    Cardiovascular: Negative for chest pain, palpitations and leg swelling.   Gastrointestinal: Negative for abdominal pain, diarrhea and vomiting.   Endocrine: Negative for cold intolerance and heat intolerance.   Genitourinary: Negative for dysuria, flank pain and hematuria.   Musculoskeletal: Positive for arthralgias (gout). Negative for back pain and neck stiffness.   Skin: Negative for rash and wound.   Neurological: Negative for seizures and syncope.   Psychiatric/Behavioral: Negative for agitation and confusion.         Objective:     /80  Pulse 87  Ht 73\" (185.4 cm)  Wt (!) 319 lb (145 kg)  SpO2 98%  BMI 42.09 kg/m2  Physical Exam   Constitutional: He is oriented to person, place, and time. He appears well-developed and well-nourished. No distress.   HENT:   Head: Normocephalic and atraumatic.   Right Ear: External ear normal.   Left Ear: External ear normal.   Nose: Nose normal.   Eyes: Conjunctivae and EOM are normal. Pupils are equal, round, and reactive to light. Right eye exhibits no discharge. Left eye exhibits no discharge. No scleral icterus.   Neck: Normal range of motion. Neck supple.   Cardiovascular: Normal rate, regular rhythm and normal heart sounds.    Pulmonary/Chest: Effort normal and breath sounds normal. No respiratory distress. He has no wheezes. He has no rales.   Abdominal: Soft. Bowel sounds are normal. He exhibits no distension. There is no tenderness.   Musculoskeletal: Normal range of motion.   Neurological: He is alert and oriented to person, place, and time.   Skin: Skin is warm and dry. He is not diaphoretic.   Psychiatric: He has a normal mood and affect. His behavior is normal. Judgment and thought content normal.   Nursing note and vitals reviewed.                                                                     Assessment/Plan:     Diagnoses and all " orders for this visit:    Type 2 diabetes mellitus with diabetic polyneuropathy, with long-term current use of insulin  -     gabapentin (NEURONTIN) 600 MG tablet; Take 1 tablet by mouth 4 (Four) Times a Day.  -     Ambulatory Referral for Diabetic Eye Exam-Ophthalmology    Diabetic polyneuropathy associated with type 2 diabetes mellitus  -     gabapentin (NEURONTIN) 600 MG tablet; Take 1 tablet by mouth 4 (Four) Times a Day.        -     One pair of diabetic shoes    Other orders  -     raNITIdine (ZANTAC) 150 MG tablet; Take 1 tablet by mouth Daily.         Follow-up:     Return in about 3 months (around 2/8/2018) for Next scheduled follow up DM and therapeutic drug monitoring.        GOALS:  Maintain medication compliance    Preventative:  Male Preventative: Cholesterol screening up to date  Vaccines:   Tetanus vaccine: up to date  Annual influenza vaccine: not up to date - declined     former smoker  occasional/rare  eat more fruits and vegetables, decrease soda or juice intake, increase water intake and increase physical activity    RISK SCORE: 3    Robert Quarles MD PGY2  [place] [address]  Office: [phone]      This document has been electronically signed by Robert Quarles MD on November 9, 2017 8:31 AM

## 2017-11-15 ENCOUNTER — TELEPHONE (OUTPATIENT)
Dept: FAMILY MEDICINE CLINIC | Facility: CLINIC | Age: 42
End: 2017-11-15

## 2017-11-15 NOTE — TELEPHONE ENCOUNTER
DR RUFUS SANTOS WILL NOW OFFER METFORMIN ER IN 90  MONTH QUANTITIES.  PATIENT DOES QUALIFY FOR THIS.  THEY SENT YOU A FAX EXPLAINING THIS RECENTLY.    THANK YOU

## 2017-12-26 RX ORDER — METFORMIN HYDROCHLORIDE 500 MG/1
TABLET, EXTENDED RELEASE ORAL
Qty: 60 TABLET | Refills: 2 | Status: SHIPPED | OUTPATIENT
Start: 2017-12-26 | End: 2018-03-13 | Stop reason: SDUPTHER

## 2018-02-13 ENCOUNTER — LAB (OUTPATIENT)
Dept: LAB | Facility: HOSPITAL | Age: 43
End: 2018-02-13

## 2018-02-13 DIAGNOSIS — Z79.4 TYPE 2 DIABETES MELLITUS WITH COMPLICATION, WITH LONG-TERM CURRENT USE OF INSULIN (HCC): ICD-10-CM

## 2018-02-13 DIAGNOSIS — E11.8 TYPE 2 DIABETES MELLITUS WITH COMPLICATION, WITH LONG-TERM CURRENT USE OF INSULIN (HCC): ICD-10-CM

## 2018-02-13 LAB — HBA1C MFR BLD: 7 % (ref 4–5.6)

## 2018-02-13 PROCEDURE — 83036 HEMOGLOBIN GLYCOSYLATED A1C: CPT

## 2018-02-21 ENCOUNTER — OFFICE VISIT (OUTPATIENT)
Dept: FAMILY MEDICINE CLINIC | Facility: CLINIC | Age: 43
End: 2018-02-21

## 2018-02-21 ENCOUNTER — APPOINTMENT (OUTPATIENT)
Dept: LAB | Facility: HOSPITAL | Age: 43
End: 2018-02-21

## 2018-02-21 VITALS
DIASTOLIC BLOOD PRESSURE: 78 MMHG | WEIGHT: 315 LBS | BODY MASS INDEX: 41.75 KG/M2 | SYSTOLIC BLOOD PRESSURE: 122 MMHG | HEART RATE: 107 BPM | OXYGEN SATURATION: 98 % | HEIGHT: 73 IN

## 2018-02-21 DIAGNOSIS — Z51.81 ENCOUNTER FOR MEDICATION MONITORING: ICD-10-CM

## 2018-02-21 DIAGNOSIS — E11.42 TYPE 2 DIABETES MELLITUS WITH DIABETIC POLYNEUROPATHY, WITHOUT LONG-TERM CURRENT USE OF INSULIN (HCC): Primary | ICD-10-CM

## 2018-02-21 PROCEDURE — 80307 DRUG TEST PRSMV CHEM ANLYZR: CPT | Performed by: FAMILY MEDICINE

## 2018-02-21 PROCEDURE — 99213 OFFICE O/P EST LOW 20 MIN: CPT | Performed by: FAMILY MEDICINE

## 2018-02-21 PROCEDURE — G0481 DRUG TEST DEF 8-14 CLASSES: HCPCS | Performed by: FAMILY MEDICINE

## 2018-02-21 RX ORDER — GABAPENTIN 600 MG/1
600 TABLET ORAL 4 TIMES DAILY
Qty: 120 TABLET | Refills: 2 | Status: SHIPPED | OUTPATIENT
Start: 2018-02-21 | End: 2018-07-17 | Stop reason: SDUPTHER

## 2018-02-21 NOTE — PROGRESS NOTES
I have reviewed the notes, assessments, and/or procedures performed by Dr. Bronson, I concur with her/his documentation and assessment and plan for Jose Sal.        This document has been electronically signed by Noemi Lobo MD on February 21, 2018 4:37 PM

## 2018-02-21 NOTE — PROGRESS NOTES
Subjective:     Jose Sal is a 42 y.o. male who presents for follow up of diabetes.     Diabetes Mellitus  Patient presents for follow up of diabetes. Diagnosed at the age of 32. Current symptoms include: paresthesia of the feet and polyuria. Symptoms have stabilized. Patient denies foot ulcerations, polydipsia and weight loss. Evaluation to date has included: hemoglobin A1C, 7.0% in February 2018.  Home sugars: not measured at home. Current treatment: metformin 1000mg XR PO daily. Last dilated eye exam: December 2017 with Dr. Carey.    Preventative:  Over the past 2 weeks, have you felt down, depressed, or hopeless?No   Over the past 2 weeks, have you felt little interest or pleasure in doing things?No  Clinical depression screening refused by patient.No     On osteoporosis therapy?No     Past Medical Hx:  Past Medical History:   Diagnosis Date   • Abdominal pain     status post laparoscopic cholecystectomy with pain in incision site   • Arthritis    • Backache    • Bipolar disorder    • Depressive disorder    • Diabetes mellitus    • Diabetes mellitus without complication     Diabetes mellitus without mention of complication, type II or unspecified type, uncontrolled    • Diabetic neuropathy    • Essential hypertension    • Generalized anxiety disorder    • Gout    • Hypertension    • Hypertensive disorder    • Kidney stone    • Low back pain    • Mood disorder    • Obesity    • Onychogryposis    • Onychomycosis    • Pain in lower limb     possible neuropathy   • Pain in toe    • Plantar fasciitis    • Radiating pain     to lumbar region of back   • Spasm of back muscles    • Type II diabetes mellitus, uncontrolled    • Ureteric stone    • Urinary tract infectious disease        Past Surgical Hx:  Past Surgical History:   Procedure Laterality Date   • CHOLECYSTECTOMY     • INJECTION OF MEDICATION  06/15/2014    Toradol (1)   • KIDNEY STONE SURGERY     • KIDNEY STONE SURGERY     • NAIL BED  REMOVAL/REVISION  05/18/2015    Excision of Nail and Nail Matrix, Permanent 20145 (1)     • OTHER SURGICAL HISTORY  03/20/2014    Avulsion of nail plate   • SHOULDER SURGERY Left        Health Maintenance:  Health Maintenance   Topic Date Due   • DIABETIC EYE EXAM  1975   • MEDICARE ANNUAL WELLNESS  04/06/2017   • LIPID PANEL  05/31/2018   • URINE MICROALBUMIN  05/31/2018   • DIABETIC FOOT EXAM  08/04/2018   • HEMOGLOBIN A1C  08/13/2018   • TDAP/TD VACCINES (2 - Td) 04/09/2025   • PNEUMOCOCCAL VACCINE (19-64 MEDIUM RISK)  Completed   • INFLUENZA VACCINE  Addressed       Current Meds:    Current Outpatient Prescriptions:   •  atorvastatin (LIPITOR) 20 MG tablet, Take 2 tablets by mouth Daily., Disp: 60 tablet, Rfl: 6  •  gabapentin (NEURONTIN) 600 MG tablet, Take 1 tablet by mouth 4 (Four) Times a Day., Disp: 120 tablet, Rfl: 2  •  metFORMIN ER (GLUCOPHAGE-XR) 500 MG 24 hr tablet, TAKE ONE TABLET BY MOUTH TWICE DAILY, Disp: 60 tablet, Rfl: 2  •  raNITIdine (ZANTAC) 150 MG tablet, Take 1 tablet by mouth Daily., Disp: 30 tablet, Rfl: 3    Current Facility-Administered Medications:   •  triamcinolone acetonide (KENALOG-40) injection 60 mg, 60 mg, Intramuscular, Once, THANH Sampson    Allergies:  Review of patient's allergies indicates no known allergies.    Family Hx:  No family history on file.     Social History:  Social History     Social History   • Marital status:      Spouse name: N/A   • Number of children: N/A   • Years of education: N/A     Occupational History   • Not on file.     Social History Main Topics   • Smoking status: Former Smoker     Quit date: 11/30/2014   • Smokeless tobacco: Current User   • Alcohol use No   • Drug use: No   • Sexual activity: Yes     Partners: Female     Birth control/ protection: Surgical     Other Topics Concern   • Not on file     Social History Narrative       Review of Systems  General: negative for - chills, fatigue, fever, hot flashes, malaise, night  "sweats, weight gain or weight loss  Psychological: negative for - anxiety, depression, sleep disturbances or suicidal ideation  Ophthalmic: negative for - blurry vision or loss of vision  ENT: negative for - hearing change, nasal congestion or sore throat  Hematological and Lymphatic: negative for - jaundice  Endocrine: POSITIVE FOR POLYURIA. negative for - hair pattern changes, skin changes or temperature intolerance  Respiratory: no cough, shortness of breath, or wheezing  Cardiovascular: no chest pain, edema or dyspnea on exertion  Gastrointestinal: no nausea/vomiting, abdominal pain, change in bowel habits, or black or bloody stools  Genito-Urinary: no dysuria, trouble voiding, or hematuria  Musculoskeletal: negative for - joint pain or muscle pain  Neurological: POSITIVE FOR PARESTHESIA OF THE LOWER EXTREMITIES. negative for - dizziness, headaches, or seizures  Dermatological: negative for rash and skin lesion changes    Objective:     /78 (BP Location: Left arm, Patient Position: Sitting, Cuff Size: Adult)  Pulse 107  Ht 185.4 cm (73\")  Wt (!) 148 kg (325 lb 8 oz)  SpO2 98%  BMI 42.94 kg/m2    General Appearance:    Alert, cooperative, no distress, appears stated age   Head:    Normocephalic, without obvious abnormality, atraumatic   Eyes:    PERRL, conjunctiva and corneas clear, EOM's intact   Ears:    Normal TM's and external ear canals, both ears   Nose:   Nares normal, septum midline, mucosa normal, no drainage   Throat:   Lips, mucosa, and tongue normal; teeth and gums normal   Neck:   Supple, symmetrical, trachea midline, no adenopathy; thyroid: no enlargement/tenderness/nodules   Back:     Symmetric, no curvature, ROM normal, no CVA tenderness   Lungs:     Clear to auscultation bilaterally, respirations unlabored    Heart:    Regular rate and rhythm, S1 and S2 normal, no murmur, no rub   Abdomen:     Soft, obese, non-tender, bowel sounds active all four quadrants, no masses   Extremities:   " Extremities normal, atraumatic, no cyanosis or edema   Pulses:   2+ and symmetric all extremities   Skin:   Skin color, texture, turgor normal, no rashes or lesions   Lymph nodes:   Cervical and supraclavicular nodes normal   Neurologic:   CNII-XII grossly intact      Assessment/Plan:     Jose was seen today for diabetes.    Diagnoses and all orders for this visit:    Type 2 diabetes mellitus with diabetic polyneuropathy, without long-term current use of insulin  -     gabapentin (NEURONTIN) 600 MG tablet; Take 1 tablet by mouth 4 (Four) Times a Day.  - Patient advised to continue metformin and diabetic diet.   - HbA1c is at goal; will recheck in 6 months.     Encounter for medication monitoring  -     ToxASSURE Select 13 (MW) - Urine, Clean Catch  -     gabapentin (NEURONTIN) 600 MG tablet; Take 1 tablet by mouth 4 (Four) Times a Day.  - [facility] REPORT: [id number]; obtained and consistent with patient's reported medications    Follow-up:     Return in about 1 month (around 3/21/2018) for Medicare Wellness.    Goals        Patient Stated    • medication refill (pt-stated)         Preventative:  -Diabetic eye exam: up to date  -Diabetic foot exam: up to date    Vaccines:   Immunization History   Administered Date(s) Administered   • Pneumococcal Conjugate 13-Valent (PCV13) 03/30/2015   • Pneumococcal Polysaccharide (PPSV23) 01/31/2011     Tetanus vaccine: unknown, record requested; advised patient to seek vaccination at the local health department  Annual influenza vaccine: declined   Pneumococcal vaccine: up to date  Hep B vaccine: unknown, record requested     RISK SCORE: 3    Signature  Patricia Bronson MD  [facility] Resident, PGY II        This document has been electronically signed by Patricia Bronson MD on February 21, 2018 4:11 PM

## 2018-02-28 LAB — CONV REPORT SUMMARY: NORMAL

## 2018-03-14 RX ORDER — METFORMIN HYDROCHLORIDE 500 MG/1
TABLET, EXTENDED RELEASE ORAL
Qty: 60 TABLET | Refills: 2 | Status: SHIPPED | OUTPATIENT
Start: 2018-03-14 | End: 2018-06-21 | Stop reason: SDUPTHER

## 2018-04-30 ENCOUNTER — HOSPITAL ENCOUNTER (EMERGENCY)
Facility: HOSPITAL | Age: 43
Discharge: LEFT BEFORE DISCHARGE | End: 2018-04-30

## 2018-04-30 ENCOUNTER — TELEPHONE (OUTPATIENT)
Dept: FAMILY MEDICINE CLINIC | Facility: CLINIC | Age: 43
End: 2018-04-30

## 2018-04-30 VITALS
SYSTOLIC BLOOD PRESSURE: 143 MMHG | TEMPERATURE: 98.6 F | BODY MASS INDEX: 41.75 KG/M2 | RESPIRATION RATE: 18 BRPM | HEIGHT: 73 IN | WEIGHT: 315 LBS | DIASTOLIC BLOOD PRESSURE: 90 MMHG | HEART RATE: 91 BPM | OXYGEN SATURATION: 98 %

## 2018-04-30 LAB
BACTERIA UR QL AUTO: ABNORMAL /HPF
BILIRUB UR QL STRIP: NEGATIVE
CLARITY UR: CLEAR
COLOR UR: YELLOW
GLUCOSE UR STRIP-MCNC: NEGATIVE MG/DL
HGB UR QL STRIP.AUTO: NEGATIVE
HYALINE CASTS UR QL AUTO: ABNORMAL /LPF
KETONES UR QL STRIP: NEGATIVE
LEUKOCYTE ESTERASE UR QL STRIP.AUTO: NEGATIVE
NITRITE UR QL STRIP: NEGATIVE
PH UR STRIP.AUTO: <=5 [PH] (ref 5–9)
PROT UR QL STRIP: ABNORMAL
RBC # UR: ABNORMAL /HPF
REF LAB TEST METHOD: ABNORMAL
SP GR UR STRIP: 1.02 (ref 1–1.03)
SQUAMOUS #/AREA URNS HPF: ABNORMAL /HPF
UROBILINOGEN UR QL STRIP: ABNORMAL
WBC UR QL AUTO: ABNORMAL /HPF

## 2018-04-30 PROCEDURE — 99211 OFF/OP EST MAY X REQ PHY/QHP: CPT

## 2018-04-30 PROCEDURE — 81001 URINALYSIS AUTO W/SCOPE: CPT

## 2018-04-30 NOTE — TELEPHONE ENCOUNTER
PT WIFE CALLED AT 11:04 AM, ASKING IF WE COULD GET THIS PT IN TO BE SEEN, I OFFERED HER THE 11:15 APPT WITH DR HOOPER, BUT SHE SAID IT WOULD TAKE HER 45 MINS TO GET HERE.  I THEN OFFERED TO MOVE THE PT FROM THE 3:30 TO AN EARLIER AFTERNOON APPT, BUT THE PT WIFE SAID HE WAS GETTING WORSE BY THE MINUTE AND SAID SHE WAS GOING TO TAKE HIM TO THE ED.

## 2018-06-11 RX ORDER — RANITIDINE 150 MG/1
TABLET ORAL
Qty: 30 TABLET | Refills: 3 | Status: SHIPPED | OUTPATIENT
Start: 2018-06-11 | End: 2018-10-24 | Stop reason: SDUPTHER

## 2018-06-21 ENCOUNTER — TELEPHONE (OUTPATIENT)
Dept: FAMILY MEDICINE CLINIC | Facility: CLINIC | Age: 43
End: 2018-06-21

## 2018-06-21 RX ORDER — METFORMIN HYDROCHLORIDE 500 MG/1
500 TABLET, EXTENDED RELEASE ORAL 2 TIMES DAILY
Qty: 60 TABLET | Refills: 6 | Status: SHIPPED | OUTPATIENT
Start: 2018-06-21 | End: 2018-06-22 | Stop reason: SDUPTHER

## 2018-06-22 ENCOUNTER — OFFICE VISIT (OUTPATIENT)
Dept: FAMILY MEDICINE CLINIC | Facility: CLINIC | Age: 43
End: 2018-06-22

## 2018-06-22 ENCOUNTER — LAB (OUTPATIENT)
Dept: LAB | Facility: HOSPITAL | Age: 43
End: 2018-06-22

## 2018-06-22 VITALS
OXYGEN SATURATION: 98 % | HEIGHT: 73 IN | HEART RATE: 102 BPM | BODY MASS INDEX: 41.75 KG/M2 | SYSTOLIC BLOOD PRESSURE: 124 MMHG | DIASTOLIC BLOOD PRESSURE: 72 MMHG | WEIGHT: 315 LBS

## 2018-06-22 DIAGNOSIS — Z13.220 NEED FOR LIPID SCREENING: ICD-10-CM

## 2018-06-22 DIAGNOSIS — E11.42 TYPE 2 DIABETES MELLITUS WITH DIABETIC POLYNEUROPATHY, WITHOUT LONG-TERM CURRENT USE OF INSULIN (HCC): ICD-10-CM

## 2018-06-22 DIAGNOSIS — E11.42 TYPE 2 DIABETES MELLITUS WITH DIABETIC POLYNEUROPATHY, WITHOUT LONG-TERM CURRENT USE OF INSULIN (HCC): Primary | ICD-10-CM

## 2018-06-22 LAB
ALBUMIN UR-MCNC: 11.1 MG/L
ARTICHOKE IGE QN: 62 MG/DL (ref 1–129)
CHOLEST SERPL-MCNC: 223 MG/DL (ref 0–199)
HDLC SERPL-MCNC: 33 MG/DL (ref 60–200)
LDLC/HDLC SERPL: ABNORMAL {RATIO} (ref 0–3.55)
TRIGL SERPL-MCNC: 744 MG/DL (ref 20–199)

## 2018-06-22 PROCEDURE — 36415 COLL VENOUS BLD VENIPUNCTURE: CPT

## 2018-06-22 PROCEDURE — 80061 LIPID PANEL: CPT

## 2018-06-22 PROCEDURE — 99213 OFFICE O/P EST LOW 20 MIN: CPT | Performed by: FAMILY MEDICINE

## 2018-06-22 PROCEDURE — 82043 UR ALBUMIN QUANTITATIVE: CPT

## 2018-06-22 RX ORDER — METFORMIN HYDROCHLORIDE 500 MG/1
500 TABLET, EXTENDED RELEASE ORAL 2 TIMES DAILY
Qty: 60 TABLET | Refills: 6 | Status: SHIPPED | OUTPATIENT
Start: 2018-06-22 | End: 2018-08-16 | Stop reason: SDUPTHER

## 2018-06-22 RX ORDER — FEBUXOSTAT 40 MG/1
1 TABLET ORAL DAILY
COMMUNITY
Start: 2018-06-12 | End: 2018-10-24 | Stop reason: SDUPTHER

## 2018-06-22 NOTE — PROGRESS NOTES
Subjective:     Jose Sal is a 43 y.o. male who presents for follow up for DM. Pt has a history of well controlled DM on metformin 500mg BID. Pts last HbA1c was 11. Pt is due for a urine microalbumin and lipid screening and is agreeable. Pt requests refill of metformin. Pt has no other current complaints.    Preventative:  Over the past 2 weeks, have you felt down, depressed, or hopeless?No   Over the past 2 weeks, have you felt little interest or pleasure in doing things?No  Clinical depression screening refused by patient.No     Past Medical Hx:  Past Medical History:   Diagnosis Date   • Abdominal pain     status post laparoscopic cholecystectomy with pain in incision site   • Arthritis    • Backache    • Bipolar disorder    • Depressive disorder    • Diabetes mellitus    • Diabetes mellitus without complication     Diabetes mellitus without mention of complication, type II or unspecified type, uncontrolled    • Diabetic neuropathy    • Essential hypertension    • Generalized anxiety disorder    • Gout    • Hypertension    • Hypertensive disorder    • Kidney stone    • Low back pain    • Mood disorder    • Obesity    • Onychogryposis    • Onychomycosis    • Pain in lower limb     possible neuropathy   • Pain in toe    • Plantar fasciitis    • Radiating pain     to lumbar region of back   • Spasm of back muscles    • Type II diabetes mellitus, uncontrolled    • Ureteric stone    • Urinary tract infectious disease        Past Surgical Hx:  Past Surgical History:   Procedure Laterality Date   • CHOLECYSTECTOMY     • INJECTION OF MEDICATION  06/15/2014    Toradol (1)   • KIDNEY STONE SURGERY     • KIDNEY STONE SURGERY     • NAIL BED REMOVAL/REVISION  05/18/2015    Excision of Nail and Nail Matrix, Permanent 71941 (1)     • OTHER SURGICAL HISTORY  03/20/2014    Avulsion of nail plate   • SHOULDER SURGERY Left        Health Maintenance:  Health Maintenance   Topic Date Due   • MEDICARE ANNUAL WELLNESS   04/06/2017   • INFLUENZA VACCINE  08/01/2018   • DIABETIC FOOT EXAM  08/04/2018   • HEMOGLOBIN A1C  08/13/2018   • DIABETIC EYE EXAM  12/05/2018   • LIPID PANEL  06/22/2019   • URINE MICROALBUMIN  06/22/2019   • TDAP/TD VACCINES (2 - Td) 04/09/2025   • PNEUMOCOCCAL VACCINE (19-64 MEDIUM RISK)  Completed       Current Meds:    Current Outpatient Prescriptions:   •  atorvastatin (LIPITOR) 20 MG tablet, Take 2 tablets by mouth Daily., Disp: 60 tablet, Rfl: 6  •  gabapentin (NEURONTIN) 600 MG tablet, Take 1 tablet by mouth 4 (Four) Times a Day., Disp: 120 tablet, Rfl: 2  •  metFORMIN ER (GLUCOPHAGE-XR) 500 MG 24 hr tablet, Take 1 tablet by mouth 2 (Two) Times a Day., Disp: 60 tablet, Rfl: 6  •  raNITIdine (ZANTAC) 150 MG tablet, TAKE ONE TABLET BY MOUTH ONCE DAILY, Disp: 30 tablet, Rfl: 3  •  ULORIC 40 MG tablet, Take 1 tablet by mouth Daily., Disp: , Rfl:     Current Facility-Administered Medications:   •  triamcinolone acetonide (KENALOG-40) injection 60 mg, 60 mg, Intramuscular, Once, THANH Sampson    Allergies:  Patient has no known allergies.    Family Hx:  No family history on file.     Social History:  Social History     Social History   • Marital status:      Spouse name: N/A   • Number of children: N/A   • Years of education: N/A     Occupational History   • Not on file.     Social History Main Topics   • Smoking status: Former Smoker     Quit date: 11/30/2014   • Smokeless tobacco: Current User   • Alcohol use No   • Drug use: No   • Sexual activity: Yes     Partners: Female     Birth control/ protection: Surgical     Other Topics Concern   • Not on file     Social History Narrative   • No narrative on file       Review of Systems  Review of Systems   Constitutional: Negative for chills and fever.   HENT: Negative for congestion and sore throat.    Eyes: Negative for pain and visual disturbance.   Respiratory: Negative for cough, shortness of breath and wheezing.    Cardiovascular: Negative for  "chest pain, palpitations and leg swelling.   Gastrointestinal: Negative for abdominal pain, diarrhea and vomiting.   Endocrine: Negative for cold intolerance and heat intolerance.   Genitourinary: Negative for dysuria, flank pain and hematuria.   Musculoskeletal: Positive for arthralgias (gout). Negative for back pain and neck stiffness.   Skin: Negative for rash and wound.   Neurological: Negative for seizures and syncope.   Psychiatric/Behavioral: Negative for agitation and confusion.         Objective:     /72 (BP Location: Left arm, Patient Position: Sitting, Cuff Size: Large Adult)   Pulse 102   Ht 185.4 cm (73\")   Wt (!) 152 kg (334 lb 7 oz)   SpO2 98%   BMI 44.12 kg/m²   Physical Exam   Constitutional: He is oriented to person, place, and time. He appears well-developed and well-nourished. No distress.   HENT:   Head: Normocephalic and atraumatic.   Right Ear: External ear normal.   Left Ear: External ear normal.   Nose: Nose normal.   Eyes: Conjunctivae and EOM are normal. Pupils are equal, round, and reactive to light. Right eye exhibits no discharge. Left eye exhibits no discharge. No scleral icterus.   Neck: Normal range of motion. Neck supple.   Cardiovascular: Normal rate, regular rhythm and normal heart sounds.    Pulmonary/Chest: Effort normal and breath sounds normal. No respiratory distress. He has no wheezes. He has no rales.   Abdominal: Soft. Bowel sounds are normal. He exhibits no distension. There is no tenderness.   Musculoskeletal: Normal range of motion.   Neurological: He is alert and oriented to person, place, and time.   Skin: Skin is warm and dry. He is not diaphoretic.   Psychiatric: He has a normal mood and affect. His behavior is normal. Judgment and thought content normal.   Nursing note and vitals reviewed.                                                                     Assessment/Plan:     Diagnoses and all orders for this visit:    Type 2 diabetes mellitus with " diabetic polyneuropathy, without long-term current use of insulin  -     metFORMIN ER (GLUCOPHAGE-XR) 500 MG 24 hr tablet; Take 1 tablet by mouth 2 (Two) Times a Day.  -     MicroAlbumin, Urine, Random - Urine, Clean Catch; Future    Need for lipid screening  -     Lipid panel; Future       Follow-up:     Return in about 4 weeks (around 7/20/2018) for Next scheduled follow up.        GOALS:  Maintain medication compliance    Preventative:  Male Preventative: Cholesterol screening up to date  Vaccines:   Tetanus vaccine: up to date  Annual influenza vaccine: not up to date - declined   Pneumococcal vaccine: up to date    former smoker  does not drink  eat more fruits and vegetables, decrease soda or juice intake, increase water intake and increase physical activity    RISK SCORE: 3    Robert Quarles MD PGY2  [place] [address]  Office: [phone]      This document has been electronically signed by Robert Quarles MD on June 25, 2018 11:06 AM

## 2018-06-27 ENCOUNTER — TELEPHONE (OUTPATIENT)
Dept: FAMILY MEDICINE CLINIC | Facility: CLINIC | Age: 43
End: 2018-06-27

## 2018-07-17 ENCOUNTER — APPOINTMENT (OUTPATIENT)
Dept: LAB | Facility: HOSPITAL | Age: 43
End: 2018-07-17

## 2018-07-17 ENCOUNTER — OFFICE VISIT (OUTPATIENT)
Dept: FAMILY MEDICINE CLINIC | Facility: CLINIC | Age: 43
End: 2018-07-17

## 2018-07-17 VITALS
SYSTOLIC BLOOD PRESSURE: 132 MMHG | DIASTOLIC BLOOD PRESSURE: 88 MMHG | BODY MASS INDEX: 41.75 KG/M2 | WEIGHT: 315 LBS | OXYGEN SATURATION: 98 % | HEIGHT: 73 IN | HEART RATE: 101 BPM

## 2018-07-17 DIAGNOSIS — E11.42 TYPE 2 DIABETES MELLITUS WITH DIABETIC POLYNEUROPATHY, WITHOUT LONG-TERM CURRENT USE OF INSULIN (HCC): Primary | ICD-10-CM

## 2018-07-17 LAB — HBA1C MFR BLD: 7.6 % (ref 4–5.6)

## 2018-07-17 PROCEDURE — 36415 COLL VENOUS BLD VENIPUNCTURE: CPT | Performed by: FAMILY MEDICINE

## 2018-07-17 PROCEDURE — 99213 OFFICE O/P EST LOW 20 MIN: CPT | Performed by: FAMILY MEDICINE

## 2018-07-17 PROCEDURE — 83036 HEMOGLOBIN GLYCOSYLATED A1C: CPT | Performed by: FAMILY MEDICINE

## 2018-07-17 RX ORDER — GABAPENTIN 600 MG/1
600 TABLET ORAL 4 TIMES DAILY
Qty: 120 TABLET | Refills: 2 | Status: SHIPPED | OUTPATIENT
Start: 2018-07-17 | End: 2018-11-26 | Stop reason: SDUPTHER

## 2018-07-17 NOTE — PROGRESS NOTES
Subjective:     Jose Sal is a 43 y.o. male who presents for follow up for DM with polyneuropathy. HEVER [id number] obtained and consistent with reporting. Pts last HbA1c was 7.0 and he is due for another. Pt does not check his sugars at home. Pt is currently on metformin 500mg BID. Pt is agreeable to continuing current regimen. Pt has associated DM polyneuropathy and has been taking gabapentin 600mg qid and is requesting refills. Pt has no other current complaints.    Preventative:  Over the past 2 weeks, have you felt down, depressed, or hopeless?No   Over the past 2 weeks, have you felt little interest or pleasure in doing things?No  Clinical depression screening refused by patient.No     Past Medical Hx:  Past Medical History:   Diagnosis Date   • Abdominal pain     status post laparoscopic cholecystectomy with pain in incision site   • Arthritis    • Backache    • Bipolar disorder (CMS/HCC)    • Depressive disorder    • Diabetes mellitus (CMS/HCC)    • Diabetes mellitus without complication (CMS/HCC)     Diabetes mellitus without mention of complication, type II or unspecified type, uncontrolled    • Diabetic neuropathy (CMS/HCC)    • Essential hypertension    • Generalized anxiety disorder    • Gout    • Hypertension    • Hypertensive disorder    • Kidney stone    • Low back pain    • Mood disorder (CMS/HCC)    • Obesity    • Onychogryposis    • Onychomycosis    • Pain in lower limb     possible neuropathy   • Pain in toe    • Plantar fasciitis    • Radiating pain     to lumbar region of back   • Spasm of back muscles    • Type II diabetes mellitus, uncontrolled (CMS/HCC)    • Ureteric stone    • Urinary tract infectious disease        Past Surgical Hx:  Past Surgical History:   Procedure Laterality Date   • CHOLECYSTECTOMY     • INJECTION OF MEDICATION  06/15/2014    Toradol (1)   • KIDNEY STONE SURGERY     • KIDNEY STONE SURGERY     • NAIL BED REMOVAL/REVISION  05/18/2015    Excision of Nail and  Nail Matrix, Permanent 98754 (1)     • OTHER SURGICAL HISTORY  03/20/2014    Avulsion of nail plate   • SHOULDER SURGERY Left        Health Maintenance:  Health Maintenance   Topic Date Due   • MEDICARE ANNUAL WELLNESS  04/06/2017   • INFLUENZA VACCINE  08/01/2018   • DIABETIC FOOT EXAM  08/04/2018   • DIABETIC EYE EXAM  12/05/2018   • HEMOGLOBIN A1C  01/17/2019   • LIPID PANEL  06/22/2019   • URINE MICROALBUMIN  06/22/2019   • TDAP/TD VACCINES (2 - Td) 04/09/2025   • PNEUMOCOCCAL VACCINE (19-64 MEDIUM RISK)  Completed       Current Meds:    Current Outpatient Prescriptions:   •  atorvastatin (LIPITOR) 20 MG tablet, Take 2 tablets by mouth Daily., Disp: 60 tablet, Rfl: 6  •  gabapentin (NEURONTIN) 600 MG tablet, Take 1 tablet by mouth 4 (Four) Times a Day., Disp: 120 tablet, Rfl: 2  •  metFORMIN ER (GLUCOPHAGE-XR) 500 MG 24 hr tablet, Take 1 tablet by mouth 2 (Two) Times a Day., Disp: 60 tablet, Rfl: 6  •  raNITIdine (ZANTAC) 150 MG tablet, TAKE ONE TABLET BY MOUTH ONCE DAILY, Disp: 30 tablet, Rfl: 3  •  ULORIC 40 MG tablet, Take 1 tablet by mouth Daily., Disp: , Rfl:     Current Facility-Administered Medications:   •  triamcinolone acetonide (KENALOG-40) injection 60 mg, 60 mg, Intramuscular, Once, Ko Dyson APRN    Allergies:  Patient has no known allergies.    Family Hx:  No family history on file.     Social History:  Social History     Social History   • Marital status:      Spouse name: N/A   • Number of children: N/A   • Years of education: N/A     Occupational History   • Not on file.     Social History Main Topics   • Smoking status: Former Smoker     Quit date: 11/30/2014   • Smokeless tobacco: Current User   • Alcohol use No   • Drug use: No   • Sexual activity: Yes     Partners: Female     Birth control/ protection: Surgical     Other Topics Concern   • Not on file     Social History Narrative   • No narrative on file       Review of Systems  Review of Systems   Constitutional: Negative  "for chills and fever.   HENT: Negative for congestion and sore throat.    Eyes: Negative for pain and visual disturbance.   Respiratory: Negative for cough, shortness of breath and wheezing.    Cardiovascular: Negative for chest pain, palpitations and leg swelling.   Gastrointestinal: Negative for abdominal pain, diarrhea and vomiting.   Endocrine: Negative for cold intolerance and heat intolerance.   Genitourinary: Negative for dysuria, flank pain and hematuria.   Musculoskeletal: Positive for arthralgias (gout). Negative for back pain and neck stiffness.   Skin: Negative for rash and wound.   Neurological: Negative for seizures and syncope.   Psychiatric/Behavioral: Negative for agitation and confusion.         Objective:     /88   Pulse 101   Ht 185.4 cm (73\")   Wt (!) 153 kg (337 lb)   SpO2 98%   BMI 44.46 kg/m²   Physical Exam   Constitutional: He is oriented to person, place, and time. He appears well-developed and well-nourished. No distress.   HENT:   Head: Normocephalic and atraumatic.   Right Ear: External ear normal.   Left Ear: External ear normal.   Nose: Nose normal.   Eyes: Pupils are equal, round, and reactive to light. Conjunctivae and EOM are normal. Right eye exhibits no discharge. Left eye exhibits no discharge. No scleral icterus.   Neck: Normal range of motion. Neck supple.   Cardiovascular: Normal rate, regular rhythm and normal heart sounds.    Pulmonary/Chest: Effort normal and breath sounds normal. No respiratory distress. He has no wheezes. He has no rales.   Abdominal: Soft. Bowel sounds are normal. He exhibits no distension. There is no tenderness.   Musculoskeletal: Normal range of motion.   Neurological: He is alert and oriented to person, place, and time.   Skin: Skin is warm and dry. He is not diaphoretic.   Psychiatric: He has a normal mood and affect. His behavior is normal. Judgment and thought content normal.   Nursing note and vitals reviewed.                        "                                              Assessment/Plan:     Diagnoses and all orders for this visit:    Type 2 diabetes mellitus with diabetic polyneuropathy, without long-term current use of insulin (CMS/[facility])  -     gabapentin (NEURONTIN) 600 MG tablet; Take 1 tablet by mouth 4 (Four) Times a Day.  -     Hemoglobin A1c         Follow-up:     Return in about 3 months (around 10/17/2018).        Goals     • weight loss            Improve diet, increase exercise              Preventative:  Male Preventative: Cholesterol screening up to date  Vaccines:   Tetanus vaccine: up to date  Annual influenza vaccine: not up to date - declined   Pneumococcal vaccine: up to date    Patient's Body mass index is 44.46 kg/m². BMI is above normal parameters. Recommendations include: exercise counseling and nutrition counseling.      former smoker  does not drink  eat more fruits and vegetables, decrease soda or juice intake, increase water intake and increase physical activity    RISK SCORE: 3    Robert Quarles MD PGY2  [place] [address]  Office: [phone]      This document has been electronically signed by Robert Quarles MD on August 2, 2018 1:19 AM

## 2018-07-17 NOTE — PROGRESS NOTES
I have reviewed the notes, assessments, and/or procedures performed by Robert Quarles MD , I concur with her/his documentation and assessment and plan for Jose Sal.        This document has been electronically signed by Noemi Lobo MD on July 17, 2018 2:22 PM

## 2018-08-16 DIAGNOSIS — E11.42 TYPE 2 DIABETES MELLITUS WITH DIABETIC POLYNEUROPATHY, WITHOUT LONG-TERM CURRENT USE OF INSULIN (HCC): ICD-10-CM

## 2018-08-16 RX ORDER — METFORMIN HYDROCHLORIDE 500 MG/1
500 TABLET, EXTENDED RELEASE ORAL 2 TIMES DAILY
Qty: 60 TABLET | Refills: 6 | Status: SHIPPED | OUTPATIENT
Start: 2018-08-16 | End: 2018-10-24 | Stop reason: SDUPTHER

## 2018-09-24 ENCOUNTER — EPISODE CHANGES (OUTPATIENT)
Dept: CASE MANAGEMENT | Facility: OTHER | Age: 43
End: 2018-09-24

## 2018-09-26 ENCOUNTER — PATIENT OUTREACH (OUTPATIENT)
Dept: CASE MANAGEMENT | Facility: OTHER | Age: 43
End: 2018-09-26

## 2018-09-26 NOTE — OUTREACH NOTE
Care Management Plan 9/26/2018   Lifestyle Goals Routine eye exam   Barriers Disease education   Self Management Get flu/pneumonia shot   Self Management Patient stated eh does not take flu vaccines   Annual Wellness Visit:  Patient Has Completed   Annual Wellness Visit:  Patient stated he believes he has completed   Care Gaps Addressed Diabetic Eye Exam   Care Gaps Addressed Not sure if was last year, [place] encouraged him to  schedule for this year   Specific Disease Process Teaching Diabetes   Advanced Directives: Patient Has   Advanced Directives: Patient believes he has, stated will check with his wife who is a nurse   Ed Visits past 12 months: 1     The main concerns and/or symptoms the patient would like to address are: Patient was busy at time of call, did not have time to discuss all areas of assessment.    Education/instruction provided by Care Coordinator: Discussion of flu vaccine, patient stated he does not take them as one in past made him sick.    Follow Up Outreach Due: Patient agreeable to call in a few weeks for follow up.    Jeane Yang RN

## 2018-10-22 ENCOUNTER — EPISODE CHANGES (OUTPATIENT)
Dept: CASE MANAGEMENT | Facility: OTHER | Age: 43
End: 2018-10-22

## 2018-10-24 ENCOUNTER — APPOINTMENT (OUTPATIENT)
Dept: LAB | Facility: HOSPITAL | Age: 43
End: 2018-10-24

## 2018-10-24 ENCOUNTER — OFFICE VISIT (OUTPATIENT)
Dept: FAMILY MEDICINE CLINIC | Facility: CLINIC | Age: 43
End: 2018-10-24

## 2018-10-24 VITALS
BODY MASS INDEX: 40.43 KG/M2 | DIASTOLIC BLOOD PRESSURE: 86 MMHG | HEIGHT: 74 IN | HEART RATE: 78 BPM | OXYGEN SATURATION: 98 % | SYSTOLIC BLOOD PRESSURE: 132 MMHG | WEIGHT: 315 LBS

## 2018-10-24 DIAGNOSIS — G89.29 CHRONIC THORACIC SPINE PAIN: Primary | ICD-10-CM

## 2018-10-24 DIAGNOSIS — E11.42 TYPE 2 DIABETES MELLITUS WITH DIABETIC POLYNEUROPATHY, WITHOUT LONG-TERM CURRENT USE OF INSULIN (HCC): ICD-10-CM

## 2018-10-24 DIAGNOSIS — M54.6 CHRONIC THORACIC SPINE PAIN: Primary | ICD-10-CM

## 2018-10-24 LAB — HBA1C MFR BLD: 6.6 % (ref 4–5.6)

## 2018-10-24 PROCEDURE — 83036 HEMOGLOBIN GLYCOSYLATED A1C: CPT | Performed by: FAMILY MEDICINE

## 2018-10-24 PROCEDURE — 36415 COLL VENOUS BLD VENIPUNCTURE: CPT | Performed by: FAMILY MEDICINE

## 2018-10-24 PROCEDURE — 99213 OFFICE O/P EST LOW 20 MIN: CPT | Performed by: FAMILY MEDICINE

## 2018-10-24 RX ORDER — CYCLOBENZAPRINE HCL 5 MG
5 TABLET ORAL 3 TIMES DAILY PRN
Qty: 90 TABLET | Refills: 0 | Status: SHIPPED | OUTPATIENT
Start: 2018-10-24 | End: 2018-11-19

## 2018-10-24 RX ORDER — RANITIDINE 150 MG/1
150 TABLET ORAL DAILY
Qty: 30 TABLET | Refills: 3 | Status: SHIPPED | OUTPATIENT
Start: 2018-10-24 | End: 2019-03-07 | Stop reason: SDUPTHER

## 2018-10-24 RX ORDER — METFORMIN HYDROCHLORIDE 500 MG/1
500 TABLET, EXTENDED RELEASE ORAL 2 TIMES DAILY
Qty: 60 TABLET | Refills: 6 | Status: SHIPPED | OUTPATIENT
Start: 2018-10-24 | End: 2019-03-07 | Stop reason: SDUPTHER

## 2018-10-24 RX ORDER — FEBUXOSTAT 40 MG/1
40 TABLET ORAL DAILY
Qty: 30 TABLET | Refills: 4 | Status: SHIPPED | OUTPATIENT
Start: 2018-10-24 | End: 2019-03-07 | Stop reason: SDUPTHER

## 2018-10-24 RX ORDER — METFORMIN HYDROCHLORIDE 500 MG/1
TABLET, EXTENDED RELEASE ORAL
COMMUNITY
Start: 2017-12-26 | End: 2018-11-02 | Stop reason: SDUPTHER

## 2018-10-24 RX ORDER — PROMETHAZINE HYDROCHLORIDE 25 MG/1
TABLET ORAL
COMMUNITY
Start: 2018-09-20 | End: 2018-11-16

## 2018-10-24 NOTE — PROGRESS NOTES
Subjective:     Jose Sal is a 43 y.o. male who presents for follow up for DM and low back pain. Pt has a history of well controlled DM on metformin 500mg BID. Pt lasts HbA1c was 7.6 and is agreeable for another. Pt does not check his sugars at home. Pt reports a 9 year history of thoracic back pain. Pt reports he has had previous MRI which showed degenerative changes. Pt reports that over the last 4 months he has noted worsening of the pain in his midback. Pt has been ibuprofen and using lidocaine patches with limited relief. Pt was unable to obtain robaxin due to insurance issues. Pt is agreeing to starting flexeril. Pt denies any bowel or bladder incontinence, sensation changes or weakness in lower extremities. Pt is agreeable to referral to orthopedic surgery. Pt has no other current complaints. Pt requesting multiple refills.    Preventative:  Over the past 2 weeks, have you felt down, depressed, or hopeless?No   Over the past 2 weeks, have you felt little interest or pleasure in doing things?No  Clinical depression screening refused by patient.No     Past Medical Hx:  Past Medical History:   Diagnosis Date   • Abdominal pain     status post laparoscopic cholecystectomy with pain in incision site   • Arthritis    • Backache    • Bipolar disorder (CMS/HCC)    • Depressive disorder    • Diabetes mellitus (CMS/HCC)    • Diabetes mellitus without complication (CMS/HCC)     Diabetes mellitus without mention of complication, type II or unspecified type, uncontrolled    • Diabetic neuropathy (CMS/HCC)    • Essential hypertension    • Generalized anxiety disorder    • Gout    • Hypertension    • Hypertensive disorder    • Kidney stone    • Low back pain    • Mood disorder (CMS/HCC)    • Obesity    • Onychogryposis    • Onychomycosis    • Pain in lower limb     possible neuropathy   • Pain in toe    • Plantar fasciitis    • Radiating pain     to lumbar region of back   • Spasm of back muscles    • Type II  diabetes mellitus, uncontrolled (CMS/HCC)    • Ureteric stone    • Urinary tract infectious disease        Past Surgical Hx:  Past Surgical History:   Procedure Laterality Date   • CHOLECYSTECTOMY     • INJECTION OF MEDICATION  06/15/2014    Toradol (1)   • KIDNEY STONE SURGERY     • KIDNEY STONE SURGERY     • NAIL BED REMOVAL/REVISION  05/18/2015    Excision of Nail and Nail Matrix, Permanent 61054 (1)     • OTHER SURGICAL HISTORY  03/20/2014    Avulsion of nail plate   • SHOULDER SURGERY Left        Health Maintenance:  Health Maintenance   Topic Date Due   • MEDICARE ANNUAL WELLNESS  04/06/2017   • INFLUENZA VACCINE  08/01/2018   • DIABETIC FOOT EXAM  08/04/2018   • DIABETIC EYE EXAM  12/05/2018   • HEMOGLOBIN A1C  04/24/2019   • LIPID PANEL  06/22/2019   • URINE MICROALBUMIN  06/22/2019   • TDAP/TD VACCINES (2 - Td) 04/09/2025   • PNEUMOCOCCAL VACCINE (19-64 MEDIUM RISK)  Completed       Current Meds:    Current Outpatient Prescriptions:   •  gabapentin (NEURONTIN) 600 MG tablet, Take 1 tablet by mouth 4 (Four) Times a Day., Disp: 120 tablet, Rfl: 2  •  metFORMIN ER (GLUCOPHAGE-XR) 500 MG 24 hr tablet, TAKE ONE TABLET BY MOUTH TWICE DAILY, Disp: , Rfl:   •  metFORMIN ER (GLUCOPHAGE-XR) 500 MG 24 hr tablet, Take 1 tablet by mouth 2 (Two) Times a Day., Disp: 60 tablet, Rfl: 6  •  promethazine (PHENERGAN) 25 MG tablet, , Disp: , Rfl:   •  raNITIdine (ZANTAC) 150 MG tablet, Take 1 tablet by mouth Daily., Disp: 30 tablet, Rfl: 3  •  ULORIC 40 MG tablet, Take 1 tablet by mouth Daily., Disp: 30 tablet, Rfl: 4  •  cyclobenzaprine (FLEXERIL) 5 MG tablet, Take 1 tablet by mouth 3 (Three) Times a Day As Needed for Muscle Spasms., Disp: 90 tablet, Rfl: 0    Current Facility-Administered Medications:   •  triamcinolone acetonide (KENALOG-40) injection 60 mg, 60 mg, Intramuscular, Once, Ko Dyson APRN    Allergies:  Patient has no known allergies.    Family Hx:  History reviewed. No pertinent family history.  "    Social History:  Social History     Social History   • Marital status:      Spouse name: N/A   • Number of children: N/A   • Years of education: N/A     Occupational History   • Not on file.     Social History Main Topics   • Smoking status: Former Smoker     Quit date: 11/30/2014   • Smokeless tobacco: Current User   • Alcohol use No   • Drug use: No   • Sexual activity: Yes     Partners: Female     Birth control/ protection: Surgical     Other Topics Concern   • Not on file     Social History Narrative   • No narrative on file       Review of Systems  Review of Systems   Constitutional: Negative for chills and fever.   HENT: Negative for congestion and sore throat.    Eyes: Negative for pain and visual disturbance.   Respiratory: Negative for cough, shortness of breath and wheezing.    Cardiovascular: Negative for chest pain, palpitations and leg swelling.   Gastrointestinal: Negative for abdominal pain, diarrhea and vomiting.   Endocrine: Negative for cold intolerance and heat intolerance.   Genitourinary: Negative for dysuria, flank pain and hematuria.   Musculoskeletal: Positive for arthralgias (gout) and back pain (chronic). Negative for neck stiffness.   Skin: Negative for rash and wound.   Neurological: Negative for seizures and syncope.   Psychiatric/Behavioral: Negative for agitation and confusion.         Objective:     /86   Pulse 78   Ht 188 cm (74\")   Wt (!) 143 kg (315 lb 5 oz)   SpO2 98%   BMI 40.48 kg/m²   Physical Exam   Constitutional: He is oriented to person, place, and time. He appears well-developed and well-nourished. No distress.   HENT:   Head: Normocephalic and atraumatic.   Right Ear: External ear normal.   Left Ear: External ear normal.   Nose: Nose normal.   Eyes: Pupils are equal, round, and reactive to light. Conjunctivae and EOM are normal. Right eye exhibits no discharge. Left eye exhibits no discharge. No scleral icterus.   Neck: Normal range of motion. Neck " supple.   Cardiovascular: Normal rate, regular rhythm and normal heart sounds.    Pulmonary/Chest: Effort normal and breath sounds normal. No respiratory distress. He has no wheezes. He has no rales.   Abdominal: Soft. Bowel sounds are normal. He exhibits no distension. There is no tenderness.   Musculoskeletal:        Thoracic back: He exhibits decreased range of motion and tenderness.   Neurological: He is alert and oriented to person, place, and time.   Skin: Skin is warm and dry. He is not diaphoretic.   Psychiatric: He has a normal mood and affect. His behavior is normal. Judgment and thought content normal.   Nursing note and vitals reviewed.                                                                     Assessment/Plan:     Diagnoses and all orders for this visit:    Chronic thoracic spine pain  -     cyclobenzaprine (FLEXERIL) 5 MG tablet; Take 1 tablet by mouth 3 (Three) Times a Day As Needed for Muscle Spasms.  -     XR Spine Thoracic 3 View (In Office)  -     Ambulatory Referral to Orthopedic Surgery    Type 2 diabetes mellitus with diabetic polyneuropathy, without long-term current use of insulin (CMS/[facility])  -     metFORMIN ER (GLUCOPHAGE-XR) 500 MG 24 hr tablet; Take 1 tablet by mouth 2 (Two) Times a Day.  -     Hemoglobin A1c    Other orders  -     promethazine (PHENERGAN) 25 MG tablet;   -     metFORMIN ER (GLUCOPHAGE-XR) 500 MG 24 hr tablet; TAKE ONE TABLET BY MOUTH TWICE DAILY  -     ULORIC 40 MG tablet; Take 1 tablet by mouth Daily.  -     raNITIdine (ZANTAC) 150 MG tablet; Take 1 tablet by mouth Daily.         Follow-up:     Return in about 4 weeks (around 11/21/2018) for Next scheduled follow up.        GOALS:  Maintain medication compliance    Preventative:  Male Preventative: Cholesterol screening up to date  Vaccines:   Tetanus vaccine: up to date  Annual influenza vaccine: up to date   Pneumococcal vaccine: up to date    former smoker  does not drink  eat more fruits and vegetables,  decrease soda or juice intake, increase water intake and increase physical activity    RISK SCORE: 3    Robert Quarles MD PGY2  [place] [address]  Office: [phone]      This document has been electronically signed by Robert Quarles MD on October 25, 2018 11:20 AM

## 2018-11-02 ENCOUNTER — OFFICE VISIT (OUTPATIENT)
Dept: ORTHOPEDIC SURGERY | Facility: CLINIC | Age: 43
End: 2018-11-02

## 2018-11-02 VITALS — HEIGHT: 74 IN | WEIGHT: 315 LBS | BODY MASS INDEX: 40.43 KG/M2

## 2018-11-02 DIAGNOSIS — M54.6 THORACIC BACK PAIN, UNSPECIFIED BACK PAIN LATERALITY, UNSPECIFIED CHRONICITY: Primary | ICD-10-CM

## 2018-11-02 PROCEDURE — 99214 OFFICE O/P EST MOD 30 MIN: CPT | Performed by: NURSE PRACTITIONER

## 2018-11-02 NOTE — PROGRESS NOTES
Jose Sal is a 43 y.o. male   Primary provider:  Robert Quarles MD       Chief Complaint   Patient presents with   • Thoracic Spine - Pain   • Establish Care       HISTORY OF PRESENT ILLNESS: Patient being seen for thoracic spine pain. Reports no known injury. Patient was seen at  and x-rays done. Patient states pain started November 2009. He has experienced flare up in the past 4 months and states the pain is getting worse and causing sleep disturbance.     Pain   This is a new problem. The current episode started more than 1 year ago. Associated symptoms comments: crushing. He has tried rest for the symptoms.        CONCURRENT MEDICAL HISTORY:    Past Medical History:   Diagnosis Date   • Abdominal pain     status post laparoscopic cholecystectomy with pain in incision site   • Arthritis    • Backache    • Bipolar disorder (CMS/HCC)    • Depressive disorder    • Diabetes mellitus (CMS/HCC)    • Diabetes mellitus without complication (CMS/HCC)     Diabetes mellitus without mention of complication, type II or unspecified type, uncontrolled    • Diabetic neuropathy (CMS/HCC)    • Essential hypertension    • Generalized anxiety disorder    • Gout    • History of skin cancer    • Hypertension    • Hypertensive disorder    • Kidney stone    • Low back pain    • Mood disorder (CMS/HCC)    • Obesity    • Onychogryposis    • Onychomycosis    • Pain in lower limb     possible neuropathy   • Pain in toe    • Plantar fasciitis    • Radiating pain     to lumbar region of back   • Sleep apnea    • Spasm of back muscles    • Stomach ulcer    • Type II diabetes mellitus, uncontrolled (CMS/HCC)    • Ureteric stone    • Urinary tract infectious disease        No Known Allergies      Current Outpatient Prescriptions:   •  cyclobenzaprine (FLEXERIL) 5 MG tablet, Take 1 tablet by mouth 3 (Three) Times a Day As Needed for Muscle Spasms., Disp: 90 tablet, Rfl: 0  •  gabapentin (NEURONTIN) 600 MG tablet, Take 1  tablet by mouth 4 (Four) Times a Day., Disp: 120 tablet, Rfl: 2  •  metFORMIN ER (GLUCOPHAGE-XR) 500 MG 24 hr tablet, Take 1 tablet by mouth 2 (Two) Times a Day., Disp: 60 tablet, Rfl: 6  •  promethazine (PHENERGAN) 25 MG tablet, , Disp: , Rfl:   •  raNITIdine (ZANTAC) 150 MG tablet, Take 1 tablet by mouth Daily., Disp: 30 tablet, Rfl: 3  •  ULORIC 40 MG tablet, Take 1 tablet by mouth Daily., Disp: 30 tablet, Rfl: 4    Current Facility-Administered Medications:   •  triamcinolone acetonide (KENALOG-40) injection 60 mg, 60 mg, Intramuscular, Once, Ko Dyson APRN    Past Surgical History:   Procedure Laterality Date   • APPENDECTOMY  04/2018   • CHOLECYSTECTOMY     • INJECTION OF MEDICATION  06/15/2014    Toradol (1)   • KIDNEY STONE SURGERY     • KIDNEY STONE SURGERY     • NAIL BED REMOVAL/REVISION  05/18/2015    Excision of Nail and Nail Matrix, Permanent 19766 (1)     • OTHER SURGICAL HISTORY  03/20/2014    Avulsion of nail plate   • SHOULDER SURGERY Left 03/2017       Family History   Problem Relation Age of Onset   • Deep vein thrombosis Other    • Hypertension Other    • Cancer Other    • Diabetes Other         Social History     Social History   • Marital status:      Spouse name: N/A   • Number of children: N/A   • Years of education: N/A     Occupational History   • Not on file.     Social History Main Topics   • Smoking status: Former Smoker     Quit date: 11/30/2014   • Smokeless tobacco: Current User   • Alcohol use No   • Drug use: No   • Sexual activity: Yes     Partners: Female     Birth control/ protection: Surgical     Other Topics Concern   • Not on file     Social History Narrative   • No narrative on file        Review of Systems   Constitutional: Negative.    HENT: Negative.    Eyes: Negative.    Respiratory: Negative.    Cardiovascular: Negative.    Gastrointestinal: Negative.    Endocrine: Negative.    Genitourinary: Negative.    Musculoskeletal: Negative.    Skin: Negative.   "  Allergic/Immunologic: Negative.    Neurological: Negative.    Hematological: Negative.    Psychiatric/Behavioral: Negative.        PHYSICAL EXAMINATION:       Ht 188 cm (74\")   Wt (!) 144 kg (318 lb)   BMI 40.83 kg/m²     Physical Exam   Constitutional: He is oriented to person, place, and time. Vital signs are normal. He appears well-developed and well-nourished. He is cooperative.   HENT:   Head: Normocephalic and atraumatic.   Neck: Trachea normal and phonation normal.   Pulmonary/Chest: Effort normal. No respiratory distress.   Abdominal: Soft. Normal appearance. He exhibits no distension.   Neurological: He is alert and oriented to person, place, and time. GCS eye subscore is 4. GCS verbal subscore is 5. GCS motor subscore is 6.   Skin: Skin is warm, dry and intact.   Psychiatric: He has a normal mood and affect. His speech is normal and behavior is normal. Judgment and thought content normal. Cognition and memory are normal.   Vitals reviewed.      GAIT:     [x]  Normal  []  Antalgic    Assistive device: [x]  None  []  Walker     []  Crutches  []  Cane     []  Wheelchair  []  Stretcher    Back Exam     Tenderness   The patient is experiencing tenderness in the thoracic.    Range of Motion   Extension: abnormal   Flexion: abnormal   Lateral Bend Right: abnormal   Lateral Bend Left: abnormal   Rotation Right: abnormal   Rotation Left: abnormal     Muscle Strength   Right Quadriceps:  5/5   Left Quadriceps:  5/5   Right Hamstrings:  5/5   Left Hamstrings:  5/5     Tests   Straight leg raise right: negative  Straight leg raise left: negative    Reflexes   Patellar: normal  Achilles: normal    Other   Toe Walk: normal  Heel Walk: normal  Sensation: normal  Gait: normal   Erythema: no back redness  Scars: absent              Xr Spine Thoracic 3 View (in Office)    Result Date: 10/25/2018  Narrative: Thoracic spine three view on 10/24/2018 CLINICAL INDICATION: Back pain COMPARISON: None FINDINGS: The thoracic " spine is well aligned. There are no fractures. No bony abnormality is noted.     Impression: No acute bony abnormality. Electronically signed by:  Prince Villalobos  10/25/2018 6:50 AM CDT Workstation: [id number]          ASSESSMENT:    Diagnoses and all orders for this visit:    Thoracic back pain, unspecified back pain laterality, unspecified chronicity  -     MRI Thoracic Spine Without Contrast; Future          PLAN  Recommend MRI of the thoracic spine for further evaluation pain.  No Follow-up on file.    Ko Dyson, APRN

## 2018-11-08 ENCOUNTER — HOSPITAL ENCOUNTER (OUTPATIENT)
Dept: MRI IMAGING | Facility: HOSPITAL | Age: 43
Discharge: HOME | End: 2018-11-08
Admitting: NURSE PRACTITIONER

## 2018-11-08 DIAGNOSIS — M54.6 THORACIC BACK PAIN, UNSPECIFIED BACK PAIN LATERALITY, UNSPECIFIED CHRONICITY: ICD-10-CM

## 2018-11-08 PROCEDURE — 72146 MRI CHEST SPINE W/O DYE: CPT

## 2018-11-19 ENCOUNTER — OFFICE VISIT (OUTPATIENT)
Dept: ORTHOPEDIC SURGERY | Facility: CLINIC | Age: 43
End: 2018-11-19

## 2018-11-19 ENCOUNTER — OFFICE VISIT (OUTPATIENT)
Dept: FAMILY MEDICINE CLINIC | Facility: CLINIC | Age: 43
End: 2018-11-19

## 2018-11-19 VITALS
WEIGHT: 315 LBS | HEART RATE: 103 BPM | HEIGHT: 74 IN | SYSTOLIC BLOOD PRESSURE: 126 MMHG | OXYGEN SATURATION: 98 % | BODY MASS INDEX: 40.43 KG/M2 | DIASTOLIC BLOOD PRESSURE: 76 MMHG

## 2018-11-19 VITALS — WEIGHT: 315 LBS | HEIGHT: 74 IN | BODY MASS INDEX: 40.43 KG/M2

## 2018-11-19 DIAGNOSIS — M51.34 DDD (DEGENERATIVE DISC DISEASE), THORACIC: Primary | ICD-10-CM

## 2018-11-19 DIAGNOSIS — M54.6 CHRONIC THORACIC SPINE PAIN: Primary | ICD-10-CM

## 2018-11-19 DIAGNOSIS — E66.01 MORBID OBESITY WITH BMI OF 40.0-44.9, ADULT (HCC): ICD-10-CM

## 2018-11-19 DIAGNOSIS — G89.29 CHRONIC THORACIC SPINE PAIN: Primary | ICD-10-CM

## 2018-11-19 PROCEDURE — 99213 OFFICE O/P EST LOW 20 MIN: CPT | Performed by: NURSE PRACTITIONER

## 2018-11-19 PROCEDURE — 99213 OFFICE O/P EST LOW 20 MIN: CPT | Performed by: FAMILY MEDICINE

## 2018-11-19 RX ORDER — BACLOFEN 10 MG/1
10 TABLET ORAL 3 TIMES DAILY
Qty: 60 TABLET | Refills: 1 | Status: SHIPPED | OUTPATIENT
Start: 2018-11-19 | End: 2019-03-07

## 2018-11-19 NOTE — PROGRESS NOTES
I have reviewed the notes, assessments, and/or procedures performed by the resident, I concur with her/his documentation and assessment and plan for Jose Sal.          This document has been electronically signed by Alexis Weeks MD on November 19, 2018 3:14 PM

## 2018-11-19 NOTE — PROGRESS NOTES
Subjective:     Jose Sal is a 43 y.o. male who presents for follow up for back pain    Patient describes onset of back pain in 09. He has had facet back injections in the past which did initially have improvement but his back pain has been worsening. He reports going to urgent care with a steroid injection but that hasn't made significant improvement in his discomfort. He saw Saad Dyson earlier today with referral to Dr. Fernández following MRI results which is scheduled for next Wednesday. Patient reports that the flexeril isn't helping with his back pain which he describes as someone pressing their knee or elbow into his thoracic spine. Discussed changing to a different muscle relaxer but that there is no guarantee a muscle relaxer will help with his symptoms. Patient has recently had weight loss by cutting back on carbohydrates with subsequent decrease in HTN and DM medications. We discussed importance of continued exercises for pain management and posture to prevent worsening of back pain.      Back Pain   This is a chronic problem. The current episode started more than 1 year ago. The problem occurs daily. The problem has been gradually worsening since onset. The pain is present in the thoracic spine. The quality of the pain is described as aching. The pain is at a severity of 6/10. The pain is moderate. The symptoms are aggravated by position. Pertinent negatives include no abdominal pain, bladder incontinence, bowel incontinence, chest pain, dysuria, fever, headaches, numbness or weakness. Risk factors include obesity and history of steroid use. He has tried heat, chiropractic manipulation, analgesics, home exercises, muscle relaxant and NSAIDs for the symptoms. The treatment provided mild relief.       Past Medical Hx:  Past Medical History:   Diagnosis Date   • Abdominal pain     status post laparoscopic cholecystectomy with pain in incision site   • Arthritis    • Backache    • Bipolar  disorder (CMS/[facility])    • Depressive disorder    • Diabetes mellitus (CMS/[facility])    • Diabetes mellitus without complication (CMS/[facility])     Diabetes mellitus without mention of complication, type II or unspecified type, uncontrolled    • Diabetic neuropathy (CMS/[facility])    • Essential hypertension    • Generalized anxiety disorder    • Gout    • History of skin cancer    • Hypertension    • Hypertensive disorder    • Kidney stone    • Low back pain    • Mood disorder (CMS/[facility])    • Obesity    • Onychogryposis    • Onychomycosis    • Pain in lower limb     possible neuropathy   • Pain in toe    • Plantar fasciitis    • Radiating pain     to lumbar region of back   • Sleep apnea    • Spasm of back muscles    • Stomach ulcer    • Type II diabetes mellitus, uncontrolled (CMS/[facility])    • Ureteric stone    • Urinary tract infectious disease        Past Surgical Hx:  Past Surgical History:   Procedure Laterality Date   • APPENDECTOMY  04/2018   • CHOLECYSTECTOMY     • INJECTION OF MEDICATION  06/15/2014    Toradol (1)   • KIDNEY STONE SURGERY     • KIDNEY STONE SURGERY     • NAIL BED REMOVAL/REVISION  05/18/2015    Excision of Nail and Nail Matrix, Permanent 10279 (1)     • OTHER SURGICAL HISTORY  03/20/2014    Avulsion of nail plate   • SHOULDER SURGERY Left 03/2017       Health Maintenance:  Health Maintenance   Topic Date Due   • MEDICARE ANNUAL WELLNESS  04/06/2017   • INFLUENZA VACCINE  08/01/2018   • DIABETIC FOOT EXAM  08/04/2018   • DIABETIC EYE EXAM  12/05/2018   • HEMOGLOBIN A1C  04/24/2019   • LIPID PANEL  06/22/2019   • URINE MICROALBUMIN  06/22/2019   • TDAP/TD VACCINES (2 - Td) 04/09/2025   • PNEUMOCOCCAL VACCINE (19-64 MEDIUM RISK)  Completed       Current Meds:    Current Outpatient Medications:   •  gabapentin (NEURONTIN) 600 MG tablet, Take 1 tablet by mouth 4 (Four) Times a Day., Disp: 120 tablet, Rfl: 2  •  metFORMIN ER (GLUCOPHAGE-XR) 500 MG 24 hr tablet, Take 1 tablet by mouth 2 (Two) Times a Day., Disp: 60  tablet, Rfl: 6  •  raNITIdine (ZANTAC) 150 MG tablet, Take 1 tablet by mouth Daily., Disp: 30 tablet, Rfl: 3  •  ULORIC 40 MG tablet, Take 1 tablet by mouth Daily., Disp: 30 tablet, Rfl: 4  •  baclofen (LIORESAL) 10 MG tablet, Take 1 tablet by mouth 3 (Three) Times a Day., Disp: 60 tablet, Rfl: 1  •  diclofenac (VOLTAREN) 50 MG EC tablet, Take 1 tablet by mouth 2 (Two) Times a Day., Disp: 30 tablet, Rfl: 2    Current Facility-Administered Medications:   •  triamcinolone acetonide (KENALOG-40) injection 60 mg, 60 mg, Intramuscular, Once, Ko Dyson APRN    Allergies:  Patient has no known allergies.    Family Hx:  Family History   Problem Relation Age of Onset   • Deep vein thrombosis Other    • Hypertension Other    • Cancer Other    • Diabetes Other         Social History:  Social History     Socioeconomic History   • Marital status:      Spouse name: Not on file   • Number of children: Not on file   • Years of education: Not on file   • Highest education level: Not on file   Social Needs   • Financial resource strain: Not on file   • Food insecurity - worry: Not on file   • Food insecurity - inability: Not on file   • Transportation needs - medical: Not on file   • Transportation needs - non-medical: Not on file   Occupational History   • Not on file   Tobacco Use   • Smoking status: Former Smoker     Packs/day: 1.00     Years: 20.00     Pack years: 20.00     Types: Cigarettes     Last attempt to quit: 11/30/2014     Years since quitting: 3.9   • Smokeless tobacco: Current User     Types: Snuff   Substance and Sexual Activity   • Alcohol use: No   • Drug use: No   • Sexual activity: Yes     Partners: Female     Birth control/protection: Surgical   Other Topics Concern   • Not on file   Social History Narrative   • Not on file       Review of Systems  Review of Systems   Constitutional: Negative for activity change, appetite change, fatigue and fever.   HENT: Negative for ear pain and sore throat.   "  Eyes: Negative for pain and visual disturbance.   Respiratory: Negative for cough and shortness of breath.    Cardiovascular: Negative for chest pain and palpitations.   Gastrointestinal: Negative for abdominal pain, bowel incontinence and nausea.   Endocrine: Negative for cold intolerance and heat intolerance.   Genitourinary: Negative for bladder incontinence, difficulty urinating and dysuria.   Musculoskeletal: Positive for back pain. Negative for arthralgias and gait problem.   Skin: Negative for color change and rash.   Neurological: Negative for dizziness, weakness, numbness and headaches.   Hematological: Negative for adenopathy. Does not bruise/bleed easily.   Psychiatric/Behavioral: Negative for agitation, confusion and sleep disturbance.       Objective:     /76 (BP Location: Left arm, Patient Position: Sitting, Cuff Size: Large Adult)   Pulse 103   Ht 188 cm (74\")   Wt (!) 146 kg (321 lb 7 oz)   SpO2 98%   BMI 41.27 kg/m²     Physical Exam   Constitutional: He is oriented to person, place, and time. He appears well-developed and well-nourished.   HENT:   Head: Normocephalic and atraumatic.   Eyes: Conjunctivae, EOM and lids are normal. Pupils are equal, round, and reactive to light.   Neck: Normal range of motion. Neck supple.   Cardiovascular: Normal rate, regular rhythm and normal heart sounds. Exam reveals no gallop and no friction rub.   No murmur heard.  Pulmonary/Chest: Effort normal and breath sounds normal.   Abdominal: Soft. Normal appearance and bowel sounds are normal. There is no tenderness.   Musculoskeletal:        Thoracic back: He exhibits normal range of motion.        Lumbar back: He exhibits normal range of motion.   Neurological: He is alert and oriented to person, place, and time.   Skin: Skin is warm, dry and intact.   Psychiatric: He has a normal mood and affect. His speech is normal and behavior is normal. Judgment and thought content normal. Cognition and memory are " normal.       No exam data present  Assessment/Plan:     Jose was seen today for back pain.    Diagnoses and all orders for this visit:    Chronic thoracic spine pain  -     diclofenac (VOLTAREN) 50 MG EC tablet; Take 1 tablet by mouth 2 (Two) Times a Day.  -     baclofen (LIORESAL) 10 MG tablet; Take 1 tablet by mouth 3 (Three) Times a Day.           Follow-up:     Patient has an apt on 11/26 with PCP    Goals     • weight loss      Improve diet, increase exercise            Preventative:    Vaccines Recommended at this visit:   Influenza    Screenings Recommended at this visit:  No Screenings offered today. Patient is up to date on all screenings at this time.     Smoking Status:  Patient is a former smoker.    Alcohol Intake:  Patient does not drink    Patient's Body mass index is 41.27 kg/m². BMI is above normal parameters. Recommendations include: exercise counseling and nutrition counseling.        RISK SCORE: 3      Signature:  Apolonia Romero MD [facility] PGY3  [place] [address]  Office: [phone]          This document has been electronically signed by Apolonia Romero MD on November 19, 2018 3:41 PM
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
## 2018-11-26 ENCOUNTER — OFFICE VISIT (OUTPATIENT)
Dept: FAMILY MEDICINE CLINIC | Facility: CLINIC | Age: 43
End: 2018-11-26

## 2018-11-26 ENCOUNTER — APPOINTMENT (OUTPATIENT)
Dept: LAB | Facility: HOSPITAL | Age: 43
End: 2018-11-26

## 2018-11-26 VITALS
DIASTOLIC BLOOD PRESSURE: 86 MMHG | OXYGEN SATURATION: 98 % | HEIGHT: 74 IN | HEART RATE: 83 BPM | BODY MASS INDEX: 40.43 KG/M2 | WEIGHT: 315 LBS | SYSTOLIC BLOOD PRESSURE: 130 MMHG

## 2018-11-26 DIAGNOSIS — G89.29 CHRONIC BILATERAL LOW BACK PAIN WITH SCIATICA, SCIATICA LATERALITY UNSPECIFIED: Primary | ICD-10-CM

## 2018-11-26 DIAGNOSIS — M54.40 CHRONIC BILATERAL LOW BACK PAIN WITH SCIATICA, SCIATICA LATERALITY UNSPECIFIED: Primary | ICD-10-CM

## 2018-11-26 DIAGNOSIS — Z51.81 THERAPEUTIC DRUG MONITORING: ICD-10-CM

## 2018-11-26 DIAGNOSIS — E11.42 TYPE 2 DIABETES MELLITUS WITH DIABETIC POLYNEUROPATHY, WITHOUT LONG-TERM CURRENT USE OF INSULIN (HCC): ICD-10-CM

## 2018-11-26 PROCEDURE — G0481 DRUG TEST DEF 8-14 CLASSES: HCPCS | Performed by: FAMILY MEDICINE

## 2018-11-26 PROCEDURE — 99213 OFFICE O/P EST LOW 20 MIN: CPT | Performed by: FAMILY MEDICINE

## 2018-11-26 PROCEDURE — 80307 DRUG TEST PRSMV CHEM ANLYZR: CPT | Performed by: FAMILY MEDICINE

## 2018-11-26 RX ORDER — GABAPENTIN 600 MG/1
600 TABLET ORAL 4 TIMES DAILY
Qty: 120 TABLET | Refills: 2 | Status: SHIPPED | OUTPATIENT
Start: 2018-11-26 | End: 2019-03-07

## 2018-11-26 NOTE — PROGRESS NOTES
Subjective:     Jose Sal is a 43 y.o. male who presents for follow up for chronic low back pain with right sided sciatica and DM peripheral neuropathy. Chintan [id number] obtained and consistent with reporting. Pt currently on gabapentin 600mg 4x a day. Pt is requesting a refill. Pt is agreeable to refill at this time. Pt reports a 2 month history of insomnia secondary to low back pain. Pt does not wish to start any medications at this time. Pt has no other current complaints. Pt declines flu vaccination.    Preventative:  Over the past 2 weeks, have you felt down, depressed, or hopeless?No   Over the past 2 weeks, have you felt little interest or pleasure in doing things?No  Clinical depression screening refused by patient.No     Past Medical Hx:  Past Medical History:   Diagnosis Date   • Abdominal pain     status post laparoscopic cholecystectomy with pain in incision site   • Arthritis    • Backache    • Bipolar disorder (CMS/HCC)    • Depressive disorder    • Diabetes mellitus (CMS/HCC)    • Diabetes mellitus without complication (CMS/HCC)     Diabetes mellitus without mention of complication, type II or unspecified type, uncontrolled    • Diabetic neuropathy (CMS/HCC)    • Essential hypertension    • Generalized anxiety disorder    • Gout    • History of skin cancer    • Hypertension    • Hypertensive disorder    • Kidney stone    • Low back pain    • Mood disorder (CMS/HCC)    • Obesity    • Onychogryposis    • Onychomycosis    • Pain in lower limb     possible neuropathy   • Pain in toe    • Plantar fasciitis    • Radiating pain     to lumbar region of back   • Sleep apnea    • Spasm of back muscles    • Stomach ulcer    • Type II diabetes mellitus, uncontrolled (CMS/HCC)    • Ureteric stone    • Urinary tract infectious disease        Past Surgical Hx:  Past Surgical History:   Procedure Laterality Date   • APPENDECTOMY  04/2018   • CHOLECYSTECTOMY     • INJECTION OF MEDICATION  06/15/2014     Toradol (1)   • KIDNEY STONE SURGERY     • KIDNEY STONE SURGERY     • NAIL BED REMOVAL/REVISION  05/18/2015    Excision of Nail and Nail Matrix, Permanent 75239 (1)     • OTHER SURGICAL HISTORY  03/20/2014    Avulsion of nail plate   • SHOULDER SURGERY Left 03/2017       Health Maintenance:  Health Maintenance   Topic Date Due   • MEDICARE ANNUAL WELLNESS  04/06/2017   • DIABETIC FOOT EXAM  08/04/2018   • DIABETIC EYE EXAM  12/05/2018   • HEMOGLOBIN A1C  04/24/2019   • LIPID PANEL  06/22/2019   • URINE MICROALBUMIN  06/22/2019   • TDAP/TD VACCINES (2 - Td) 04/09/2025   • PNEUMOCOCCAL VACCINE (19-64 MEDIUM RISK)  Completed   • INFLUENZA VACCINE  Addressed       Current Meds:    Current Outpatient Medications:   •  baclofen (LIORESAL) 10 MG tablet, Take 1 tablet by mouth 3 (Three) Times a Day., Disp: 60 tablet, Rfl: 1  •  diclofenac (VOLTAREN) 50 MG EC tablet, Take 1 tablet by mouth 2 (Two) Times a Day., Disp: 30 tablet, Rfl: 2  •  gabapentin (NEURONTIN) 600 MG tablet, Take 1 tablet by mouth 4 (Four) Times a Day., Disp: 120 tablet, Rfl: 2  •  metFORMIN ER (GLUCOPHAGE-XR) 500 MG 24 hr tablet, Take 1 tablet by mouth 2 (Two) Times a Day., Disp: 60 tablet, Rfl: 6  •  raNITIdine (ZANTAC) 150 MG tablet, Take 1 tablet by mouth Daily., Disp: 30 tablet, Rfl: 3  •  ULORIC 40 MG tablet, Take 1 tablet by mouth Daily., Disp: 30 tablet, Rfl: 4  No current facility-administered medications for this visit.     Allergies:  Patient has no known allergies.    Family Hx:  Family History   Problem Relation Age of Onset   • Deep vein thrombosis Other    • Hypertension Other    • Cancer Other    • Diabetes Other         Social History:  Social History     Socioeconomic History   • Marital status:      Spouse name: Not on file   • Number of children: Not on file   • Years of education: Not on file   • Highest education level: Not on file   Social Needs   • Financial resource strain: Not on file   • Food insecurity - worry: Not on file  "  • Food insecurity - inability: Not on file   • Transportation needs - medical: Not on file   • Transportation needs - non-medical: Not on file   Occupational History   • Not on file   Tobacco Use   • Smoking status: Former Smoker     Packs/day: 1.00     Years: 20.00     Pack years: 20.00     Types: Cigarettes     Last attempt to quit: 11/30/2014     Years since quitting: 3.9   • Smokeless tobacco: Current User     Types: Snuff   Substance and Sexual Activity   • Alcohol use: No   • Drug use: No   • Sexual activity: Yes     Partners: Female     Birth control/protection: Surgical   Other Topics Concern   • Not on file   Social History Narrative   • Not on file       Review of Systems  Review of Systems   Constitutional: Negative for chills and fever.   HENT: Negative for congestion and sore throat.    Eyes: Negative for pain and visual disturbance.   Respiratory: Negative for cough, shortness of breath and wheezing.    Cardiovascular: Negative for chest pain, palpitations and leg swelling.   Gastrointestinal: Negative for abdominal pain, diarrhea and vomiting.   Endocrine: Negative for cold intolerance and heat intolerance.   Genitourinary: Negative for dysuria, flank pain and hematuria.   Musculoskeletal: Positive for arthralgias (gout) and back pain (chronic). Negative for neck stiffness.   Skin: Negative for rash and wound.   Neurological: Negative for seizures and syncope.   Psychiatric/Behavioral: Negative for agitation and confusion.         Objective:     /86   Pulse 83   Ht 188 cm (74\")   Wt (!) 146 kg (322 lb 5 oz)   SpO2 98%   BMI 41.38 kg/m²   Physical Exam   Constitutional: He is oriented to person, place, and time. He appears well-developed and well-nourished. No distress.   HENT:   Head: Normocephalic and atraumatic.   Right Ear: External ear normal.   Left Ear: External ear normal.   Nose: Nose normal.   Eyes: Conjunctivae and EOM are normal. Pupils are equal, round, and reactive to light. " Right eye exhibits no discharge. Left eye exhibits no discharge. No scleral icterus.   Neck: Normal range of motion. Neck supple.   Cardiovascular: Normal rate, regular rhythm and normal heart sounds.   Pulmonary/Chest: Effort normal and breath sounds normal. No respiratory distress. He has no wheezes. He has no rales.   Abdominal: Soft. Bowel sounds are normal. He exhibits no distension. There is no tenderness.   Musculoskeletal:        Thoracic back: He exhibits decreased range of motion and tenderness.   Neurological: He is alert and oriented to person, place, and time.   Skin: Skin is warm and dry. He is not diaphoretic.   Psychiatric: He has a normal mood and affect. His behavior is normal. Judgment and thought content normal.   Nursing note and vitals reviewed.                                                                     Assessment/Plan:     Diagnoses and all orders for this visit:    Chronic bilateral low back pain with sciatica, sciatica laterality unspecified  -     ToxASSURE Select 13 (MW) - Urine, Clean Catch; Future  -     ToxASSURE Select 13 (MW) - Urine, Clean Catch    Type 2 diabetes mellitus with diabetic polyneuropathy, without long-term current use of insulin (CMS/[facility])  -     gabapentin (NEURONTIN) 600 MG tablet; Take 1 tablet by mouth 4 (Four) Times a Day.    Therapeutic drug monitoring  -     ToxASSURE Select 13 (MW) - Urine, Clean Catch; Future  -     ToxASSURE Select 13 (MW) - Urine, Clean Catch         Follow-up:     Return in about 3 months (around 2/26/2019) for Next scheduled follow up.        GOALS:  Maintain medication compliance    Preventative:  Male Preventative: Exercises regularly  Vaccines:   Annual influenza vaccine: not up to date - declined     former smoker  does not drink  eat more fruits and vegetables, decrease soda or juice intake, increase water intake and increase physical activity    RISK SCORE: 3    Robert Quarles MD PGY2  Family Practice Residency  [facility] [address]  Office: [phone]      This document has been electronically signed by Robert Quarles MD on November 26, 2018 11:21 AM

## 2018-11-26 NOTE — PROGRESS NOTES
I have reviewed the notes, assessments, and/or procedures performed by the resident, I concur with her/his documentation and assessment and plan for Jose Sal.          This document has been electronically signed by Alexis Weeks MD on November 26, 2018 11:18 AM

## 2018-11-30 LAB — CONV REPORT SUMMARY: NORMAL

## 2018-12-17 ENCOUNTER — OFFICE VISIT (OUTPATIENT)
Dept: FAMILY MEDICINE CLINIC | Facility: CLINIC | Age: 43
End: 2018-12-17

## 2018-12-17 VITALS
SYSTOLIC BLOOD PRESSURE: 174 MMHG | HEIGHT: 74 IN | HEART RATE: 94 BPM | BODY MASS INDEX: 40.43 KG/M2 | OXYGEN SATURATION: 96 % | DIASTOLIC BLOOD PRESSURE: 68 MMHG | WEIGHT: 315 LBS

## 2018-12-17 DIAGNOSIS — G89.29 CHRONIC THORACIC SPINE PAIN: Primary | ICD-10-CM

## 2018-12-17 DIAGNOSIS — M54.6 CHRONIC THORACIC SPINE PAIN: Primary | ICD-10-CM

## 2018-12-17 PROCEDURE — 99213 OFFICE O/P EST LOW 20 MIN: CPT | Performed by: STUDENT IN AN ORGANIZED HEALTH CARE EDUCATION/TRAINING PROGRAM

## 2018-12-17 RX ORDER — MELOXICAM 7.5 MG/1
7.5 TABLET ORAL DAILY
Qty: 30 TABLET | Refills: 0 | Status: SHIPPED | OUTPATIENT
Start: 2018-12-17 | End: 2019-03-07

## 2018-12-24 NOTE — PROGRESS NOTES
Subjective:     Jose Sal is a 43 y.o. male who presents for back pain.    He reports middle back pain and low back pain since this summer. He has seen Ko Dyson in orthopedics who referred him to Dr. Fernández for possible surgery. Patient states he wanted to see a different orthopedist than Dr. Fernández so went to [place] for second opinion. From that orthopedist's office he was referred to neurosurgery, where he has an appointment on January 10th for evaluation for surgery. He denies injury to the area. He reports pain is worst on extension of back or on standing/sitting for long periods of time. It is better with flexed, like over a shopping cart. He denies changes in bowel or bladder habits. It is a sharp, stabbing pain that radiates down right leg occasionally. It is constant with intermittent flares of pain. He has tried steroid injections in the past without relief. He has tried PT without relief of pain though he is still doing the exercises at home. Baclofen and diclofenac are not helping. He has tried other OTC NSAIDs and tylenol without relief, though he says generally tylenol helps more than NSAIDs.    Preventative:  Over the past 2 weeks, have you felt down, depressed, or hopeless?No   Over the past 2 weeks, have you felt little interest or pleasure in doing things?No  Clinical depression screening refused by patient.No     On osteoporosis therapy?No     Past Medical Hx:  Past Medical History:   Diagnosis Date   • Abdominal pain     status post laparoscopic cholecystectomy with pain in incision site   • Arthritis    • Backache    • Bipolar disorder (CMS/HCC)    • Depressive disorder    • Diabetes mellitus (CMS/HCC)    • Diabetes mellitus without complication (CMS/HCC)     Diabetes mellitus without mention of complication, type II or unspecified type, uncontrolled    • Diabetic neuropathy (CMS/HCC)    • Essential hypertension    • Generalized anxiety disorder    • Gout    • History of  skin cancer    • Hypertension    • Hypertensive disorder    • Kidney stone    • Low back pain    • Mood disorder (CMS/HCC)    • Obesity    • Onychogryposis    • Onychomycosis    • Pain in lower limb     possible neuropathy   • Pain in toe    • Plantar fasciitis    • Radiating pain     to lumbar region of back   • Sleep apnea    • Spasm of back muscles    • Stomach ulcer    • Type II diabetes mellitus, uncontrolled (CMS/HCC)    • Ureteric stone    • Urinary tract infectious disease        Past Surgical Hx:  Past Surgical History:   Procedure Laterality Date   • APPENDECTOMY  04/2018   • CHOLECYSTECTOMY     • INJECTION OF MEDICATION  06/15/2014    Toradol (1)   • KIDNEY STONE SURGERY     • KIDNEY STONE SURGERY     • NAIL BED REMOVAL/REVISION  05/18/2015    Excision of Nail and Nail Matrix, Permanent 32928 (1)     • OTHER SURGICAL HISTORY  03/20/2014    Avulsion of nail plate   • SHOULDER SURGERY Left 03/2017       Health Maintenance:  Health Maintenance   Topic Date Due   • HEPATITIS A VACCINE ADULT (1 of 2) 04/18/1995   • MEDICARE ANNUAL WELLNESS  04/06/2017   • DIABETIC FOOT EXAM  08/04/2018   • DIABETIC EYE EXAM  12/05/2018   • HEMOGLOBIN A1C  04/24/2019   • LIPID PANEL  06/22/2019   • URINE MICROALBUMIN  06/22/2019   • TDAP/TD VACCINES (2 - Td) 04/09/2025   • PNEUMOCOCCAL VACCINE (19-64 MEDIUM RISK)  Completed   • INFLUENZA VACCINE  Addressed       Current Meds:    Current Outpatient Medications:   •  baclofen (LIORESAL) 10 MG tablet, Take 1 tablet by mouth 3 (Three) Times a Day., Disp: 60 tablet, Rfl: 1  •  Febuxostat (ULORIC PO), Take  by mouth., Disp: , Rfl:   •  gabapentin (NEURONTIN) 600 MG tablet, Take 1 tablet by mouth 4 (Four) Times a Day., Disp: 120 tablet, Rfl: 2  •  metFORMIN ER (GLUCOPHAGE-XR) 500 MG 24 hr tablet, Take 1 tablet by mouth 2 (Two) Times a Day., Disp: 60 tablet, Rfl: 6  •  raNITIdine (ZANTAC) 150 MG tablet, Take 1 tablet by mouth Daily., Disp: 30 tablet, Rfl: 3  •  ULORIC 40 MG tablet,  Take 1 tablet by mouth Daily., Disp: 30 tablet, Rfl: 4  •  meloxicam (MOBIC) 7.5 MG tablet, Take 1 tablet by mouth Daily., Disp: 30 tablet, Rfl: 0    Allergies:  Patient has no known allergies.    Family Hx:  Family History   Problem Relation Age of Onset   • Deep vein thrombosis Other    • Hypertension Other    • Cancer Other    • Diabetes Other         Social History:  Social History     Socioeconomic History   • Marital status:      Spouse name: Not on file   • Number of children: Not on file   • Years of education: Not on file   • Highest education level: Not on file   Social Needs   • Financial resource strain: Not on file   • Food insecurity - worry: Not on file   • Food insecurity - inability: Not on file   • Transportation needs - medical: Not on file   • Transportation needs - non-medical: Not on file   Occupational History   • Not on file   Tobacco Use   • Smoking status: Former Smoker     Packs/day: 1.00     Years: 20.00     Pack years: 20.00     Types: Cigarettes     Last attempt to quit: 2014     Years since quittin.0   • Smokeless tobacco: Current User     Types: Snuff   Substance and Sexual Activity   • Alcohol use: No   • Drug use: No   • Sexual activity: Yes     Partners: Female     Birth control/protection: Surgical   Other Topics Concern   • Not on file   Social History Narrative   • Not on file       Review of Systems   Constitutional: Negative for activity change, appetite change, chills, fatigue and fever.   HENT: Negative for hearing loss, sneezing, sore throat and trouble swallowing.    Eyes: Negative for visual disturbance.   Respiratory: Negative for cough, chest tightness and shortness of breath.    Cardiovascular: Negative for chest pain, palpitations and leg swelling.   Gastrointestinal: Negative for abdominal pain, blood in stool, constipation, diarrhea, nausea and vomiting.   Genitourinary: Negative for difficulty urinating, dysuria and frequency.   Musculoskeletal:  "Positive for back pain. Negative for arthralgias.   Skin: Negative for rash.   Allergic/Immunologic: Negative for environmental allergies and food allergies.   Neurological: Positive for numbness (feet and legs bilaterally). Negative for dizziness, light-headedness and headaches.   Psychiatric/Behavioral: Negative for agitation, confusion, hallucinations and suicidal ideas.           Objective:     /68 (BP Location: Right arm, Patient Position: Sitting, Cuff Size: Adult)   Pulse 94   Ht 188 cm (74\")   Wt (!) 149 kg (328 lb 6.4 oz)   SpO2 96%   BMI 42.16 kg/m²     Physical Exam   Constitutional: He is oriented to person, place, and time. He appears well-developed and well-nourished. No distress.   HENT:   Head: Normocephalic and atraumatic.   Right Ear: External ear normal.   Left Ear: External ear normal.   Eyes: Pupils are equal, round, and reactive to light. No scleral icterus.   Neck: Normal range of motion. Neck supple. No thyromegaly present.   Cardiovascular: Normal rate, regular rhythm and normal heart sounds.   Pulmonary/Chest: Effort normal and breath sounds normal. No respiratory distress.   Abdominal: Soft. Bowel sounds are normal. He exhibits no distension and no mass. There is no tenderness.   Musculoskeletal: Normal range of motion. He exhibits tenderness (palpation of mid-thoracic spine). He exhibits no edema.   Lymphadenopathy:     He has no cervical adenopathy.   Neurological: He is alert and oriented to person, place, and time. He has normal reflexes.   Skin: Skin is warm and dry.   Psychiatric: He has a normal mood and affect. His behavior is normal.         Assessment/Plan:     Jose was seen today for back pain.    Diagnoses and all orders for this visit:    Chronic thoracic spine pain  -     meloxicam (MOBIC) 7.5 MG tablet; Take 1 tablet by mouth Daily.  -     TENS (Transcutaneous Electrical Nerve Stimulator)      Discussed with patient stopping baclofen and diclofenac and starting " mobic. Encouraged patient to continue PT exercises. Encouraged patient to attend neurosurgery appointment if he feels he is not getting relief from various pharmacologic and non-pharmacologic sources of pain relief. Discussed with patient that I would not begin a prescription of opiates or controlled substances for relief of his pain.    Follow-up:     Return in about 6 weeks (around 1/28/2019).      Health Maintenance   Topic Date Due   • HEPATITIS A VACCINE ADULT (1 of 2) 04/18/1995   • MEDICARE ANNUAL WELLNESS  04/06/2017   • DIABETIC FOOT EXAM  08/04/2018   • DIABETIC EYE EXAM  12/05/2018   • HEMOGLOBIN A1C  04/24/2019   • LIPID PANEL  06/22/2019   • URINE MICROALBUMIN  06/22/2019   • TDAP/TD VACCINES (2 - Td) 04/09/2025   • PNEUMOCOCCAL VACCINE (19-64 MEDIUM RISK)  Completed   • INFLUENZA VACCINE  Addressed       Goals     • weight loss      Improve diet, increase exercise            Preventative:    Vaccines Recommended at this visit:   No Vaccines recommended today. Patient is up to date on all vaccines.     Vaccines Received at this visit:  No Vaccines recommended today. Patient is up to date on all vaccines.     Screenings Recommended at this visit:  Diabetic Eye Exam    Screenings Ordered at this visit:  defer to PCP for diabetes management    Smoking Status:  Patient is a former smoker.    Alcohol Intake:  Patient does not drink    Patient's Body mass index is 42.16 kg/m². BMI is above normal parameters. Recommendations include: exercise counseling and nutrition counseling.         RISK SCORE: 3      This document has been electronically signed by Na Hernandez MD on December 24, 2018 7:09 AM

## 2018-12-26 NOTE — PROGRESS NOTES
I have reviewed the notes, assessments, and/or procedures performed by Dr. Hernandez, I concur with her/his documentation of Jose Sal.

## 2019-03-07 ENCOUNTER — OFFICE VISIT (OUTPATIENT)
Dept: FAMILY MEDICINE CLINIC | Facility: CLINIC | Age: 44
End: 2019-03-07

## 2019-03-07 VITALS
BODY MASS INDEX: 40.43 KG/M2 | HEIGHT: 74 IN | SYSTOLIC BLOOD PRESSURE: 130 MMHG | WEIGHT: 315 LBS | DIASTOLIC BLOOD PRESSURE: 72 MMHG

## 2019-03-07 DIAGNOSIS — E66.01 CLASS 3 SEVERE OBESITY DUE TO EXCESS CALORIES WITH SERIOUS COMORBIDITY AND BODY MASS INDEX (BMI) OF 40.0 TO 44.9 IN ADULT (HCC): ICD-10-CM

## 2019-03-07 DIAGNOSIS — E78.1 HYPERTRIGLYCERIDEMIA: ICD-10-CM

## 2019-03-07 DIAGNOSIS — E78.5 HYPERLIPIDEMIA ASSOCIATED WITH TYPE 2 DIABETES MELLITUS (HCC): ICD-10-CM

## 2019-03-07 DIAGNOSIS — E11.69 HYPERLIPIDEMIA ASSOCIATED WITH TYPE 2 DIABETES MELLITUS (HCC): ICD-10-CM

## 2019-03-07 DIAGNOSIS — E11.42 TYPE 2 DIABETES MELLITUS WITH DIABETIC POLYNEUROPATHY, WITHOUT LONG-TERM CURRENT USE OF INSULIN (HCC): Primary | ICD-10-CM

## 2019-03-07 PROCEDURE — 99213 OFFICE O/P EST LOW 20 MIN: CPT | Performed by: FAMILY MEDICINE

## 2019-03-07 RX ORDER — FENOFIBRATE 145 MG/1
145 TABLET, COATED ORAL DAILY
Qty: 30 TABLET | Refills: 5 | Status: SHIPPED | OUTPATIENT
Start: 2019-03-07 | End: 2019-03-08

## 2019-03-07 RX ORDER — RANITIDINE 150 MG/1
150 TABLET ORAL DAILY
Qty: 30 TABLET | Refills: 5 | Status: SHIPPED | OUTPATIENT
Start: 2019-03-07 | End: 2019-11-14 | Stop reason: SDUPTHER

## 2019-03-07 RX ORDER — METFORMIN HYDROCHLORIDE 500 MG/1
500 TABLET, EXTENDED RELEASE ORAL 2 TIMES DAILY
Qty: 60 TABLET | Refills: 6 | Status: SHIPPED | OUTPATIENT
Start: 2019-03-07 | End: 2020-02-03 | Stop reason: SDUPTHER

## 2019-03-07 RX ORDER — FEBUXOSTAT 40 MG/1
40 TABLET ORAL DAILY
Qty: 30 TABLET | Refills: 5 | Status: SHIPPED | OUTPATIENT
Start: 2019-03-07 | End: 2019-09-24 | Stop reason: SDUPTHER

## 2019-03-07 NOTE — PROGRESS NOTES
Subjective:     Jose Sal is a 43 y.o. male who presents for follow up of DM II.     Diabetes Mellitus  Patient presents for follow up of diabetes. Current symptoms include: polydipsia, polyuria and weight loss. Diagnosed in 2011. Symptoms have stabilized. Patient denies nausea, visual disturbances and vomiting. Evaluation to date has included: hemoglobin A1C (6.6% in October 2018).  Home sugars: patient does not check sugars. Current treatment: metformin 500mg. Last dilated eye exam: December 2017.    Thoracic Stenosis  Patient was seen by neurology and neurosurgery at [facility] in [place], [place]. Patient is not a candidate for surgery at this time. Patient was subsequently referred to pain management with plans for epidural injections. Pain management will be resuming care of patient's back pain and managing his gabapentin.     Preventative:  On osteoporosis therapy? Not Indicated     Past Medical Hx:  Past Medical History:   Diagnosis Date   • Abdominal pain     status post laparoscopic cholecystectomy with pain in incision site   • Arthritis    • Backache    • Bipolar disorder (CMS/HCC)    • Depressive disorder    • Diabetes mellitus (CMS/HCC)    • Diabetes mellitus without complication (CMS/HCC)     Diabetes mellitus without mention of complication, type II or unspecified type, uncontrolled    • Diabetic neuropathy (CMS/HCC)    • Essential hypertension    • Generalized anxiety disorder    • Gout    • History of skin cancer    • Hypertension    • Hypertensive disorder    • Kidney stone    • Low back pain    • Mood disorder (CMS/HCC)    • Obesity    • Onychogryposis    • Onychomycosis    • Pain in lower limb     possible neuropathy   • Pain in toe    • Plantar fasciitis    • Radiating pain     to lumbar region of back   • Sleep apnea    • Spasm of back muscles    • Stomach ulcer    • Type II diabetes mellitus, uncontrolled (CMS/HCC)    • Ureteric stone    • Urinary tract infectious disease         Past Surgical Hx:  Past Surgical History:   Procedure Laterality Date   • APPENDECTOMY  04/2018   • CHOLECYSTECTOMY     • INJECTION OF MEDICATION  06/15/2014    Toradol (1)   • KIDNEY STONE SURGERY     • KIDNEY STONE SURGERY     • NAIL BED REMOVAL/REVISION  05/18/2015    Excision of Nail and Nail Matrix, Permanent 50279 (1)     • OTHER SURGICAL HISTORY  03/20/2014    Avulsion of nail plate   • SHOULDER SURGERY Left 03/2017       Health Maintenance:  Health Maintenance   Topic Date Due   • MEDICARE ANNUAL WELLNESS  04/06/2017   • DIABETIC FOOT EXAM  08/04/2018   • DIABETIC EYE EXAM  12/05/2018   • HEMOGLOBIN A1C  04/24/2019   • LIPID PANEL  06/22/2019   • URINE MICROALBUMIN  06/22/2019   • TDAP/TD VACCINES (2 - Td) 04/09/2025   • PNEUMOCOCCAL VACCINE (19-64 MEDIUM RISK)  Completed   • INFLUENZA VACCINE  Addressed       Current Meds:    Current Outpatient Medications:   •  metFORMIN ER (GLUCOPHAGE-XR) 500 MG 24 hr tablet, Take 1 tablet by mouth 2 (Two) Times a Day., Disp: 60 tablet, Rfl: 6  •  raNITIdine (ZANTAC) 150 MG tablet, Take 1 tablet by mouth Daily., Disp: 30 tablet, Rfl: 3  •  ULORIC 40 MG tablet, Take 1 tablet by mouth Daily., Disp: 30 tablet, Rfl: 4    Allergies:  Patient has no known allergies.    Family Hx:  Family History   Problem Relation Age of Onset   • Deep vein thrombosis Other    • Hypertension Other    • Cancer Other    • Diabetes Other         Social History:  Social History     Socioeconomic History   • Marital status:      Spouse name: Not on file   • Number of children: Not on file   • Years of education: Not on file   • Highest education level: Not on file   Social Needs   • Financial resource strain: Not on file   • Food insecurity - worry: Not on file   • Food insecurity - inability: Not on file   • Transportation needs - medical: Not on file   • Transportation needs - non-medical: Not on file   Occupational History   • Not on file   Tobacco Use   • Smoking status: Former  "Smoker     Packs/day: 1.00     Years: 20.00     Pack years: 20.00     Types: Cigarettes     Last attempt to quit: 2014     Years since quittin.2   • Smokeless tobacco: Current User     Types: Snuff   Substance and Sexual Activity   • Alcohol use: No   • Drug use: No   • Sexual activity: Yes     Partners: Female     Birth control/protection: Surgical   Other Topics Concern   • Not on file   Social History Narrative   • Not on file       Review of Systems  Review of Systems   Constitutional: Negative for chills, diaphoresis, fatigue and fever.   HENT: Negative for congestion, rhinorrhea, sneezing and sore throat.    Respiratory: Negative for cough and shortness of breath.    Cardiovascular: Negative for chest pain and leg swelling.   Gastrointestinal: Negative for abdominal pain, constipation, diarrhea, nausea and vomiting.   Endocrine: Positive for polyphagia and polyuria.   Genitourinary: Negative for difficulty urinating and hematuria.   Musculoskeletal: Positive for arthralgias and back pain. Negative for gait problem and joint swelling.   Skin: Negative for rash and wound.   Neurological: Negative for seizures, syncope and headaches.   Psychiatric/Behavioral: Negative for confusion and sleep disturbance.       Objective:     /72   Ht 188 cm (74\")   Wt (!) 145 kg (320 lb)   BMI 41.09 kg/m²     Physical Exam   Constitutional: He is oriented to person, place, and time. He appears well-developed and well-nourished. No distress. He is morbidly obese.  HENT:   Head: Normocephalic and atraumatic.   Right Ear: Tympanic membrane normal.   Left Ear: Tympanic membrane normal.   Nose: Nose normal.   Mouth/Throat: Oropharynx is clear and moist. Abnormal dentition. Dental caries present.   Eyes: Conjunctivae are normal. Pupils are equal, round, and reactive to light.   Neck: Neck supple. No tracheal deviation present. No thyromegaly present.   Cardiovascular: Normal rate, regular rhythm, normal heart sounds " and intact distal pulses.   Pulmonary/Chest: Effort normal and breath sounds normal.   Abdominal: Soft. Bowel sounds are normal. There is no tenderness.   Lymphadenopathy:     He has no cervical adenopathy.   Neurological: He is alert and oriented to person, place, and time.   Skin: Skin is warm and dry. Capillary refill takes less than 2 seconds. He is not diaphoretic.   Psychiatric: He has a normal mood and affect. His behavior is normal. Judgment and thought content normal.   Vitals reviewed.    Lab Results   Component Value Date    WBC 9.64 05/07/2017    HGB 11.4 (L) 05/07/2017    HCT 33.9 (L) 05/07/2017    MCV 81.9 05/07/2017     05/07/2017     Lab Results   Component Value Date    GLUCOSE 111 (H) 05/07/2017    BUN 18 04/30/2018    CREATININE 1.00 04/30/2018    EGFRIFNONA 87 05/07/2017    EGFRIFAFRI 100 04/30/2018    BCR 18.8 05/07/2017    K 4.2 04/30/2018    CO2 23 04/30/2018    CALCIUM 9.2 04/30/2018    ALBUMIN 4.3 04/30/2018    AST 20 04/30/2018    ALT 34 04/30/2018        Assessment/Plan:     Jose was seen today for diabetes.    Diagnoses and all orders for this visit:    Type 2 diabetes mellitus with diabetic polyneuropathy, without long-term current use of insulin (CMS/[facility])  -     Hemoglobin A1c; Future  -     metFORMIN ER (GLUCOPHAGE-XR) 500 MG 24 hr tablet; Take 1 tablet by mouth 2 (Two) Times a Day.  -     Ambulatory Referral to Ophthalmology    Hyperlipidemia associated with type 2 diabetes mellitus (CMS/[facility])  -     Discontinue: fenofibrate (TRICOR) 145 MG tablet; Take 1 tablet by mouth Daily.  - Encouraged patient to retry statin therapy, cutting atorvastatin in half and gradually increasing to full dose.     Hypertriglyceridemia  -     Discontinue: fenofibrate (TRICOR) 145 MG tablet; Take 1 tablet by mouth Daily.  - Encouraged patient to retry statin therapy, cutting atorvastatin in half and gradually increasing to full dose.     Class 3 severe obesity due to excess calories with serious  comorbidity and body mass index (BMI) of 40.0 to 44.9 in adult (CMS/[facility])  -Encouraged 30 minutes of moderate intensity activity at least 5 days a week.     Other orders  -     raNITIdine (ZANTAC) 150 MG tablet; Take 1 tablet by mouth Daily.  -     ULORIC 40 MG tablet; Take 1 tablet by mouth Daily.      Follow-up:     Return in about 2 months (around 5/7/2019) for Recheck DM II.    Goals     • weight loss      Improve diet, increase exercise            Preventative:  -Patient's Body mass index is 41.09 kg/m². BMI is above normal parameters. Recommendations include: exercise counseling and nutrition counseling.    Vaccines:  Immunization History   Administered Date(s) Administered   • Pneumococcal Conjugate 13-Valent (PCV13) 03/30/2015   • Pneumococcal Polysaccharide (PPSV23) 01/31/2011       Tetanus vaccine: not up to date. Patient advised to seek vaccination at the local health department  Annual influenza vaccine: not up to date. Patient advised to follow up with PCP for next influenza season  Pneumococcal vaccine: up to date.     RISK SCORE: 4    Signature  Patricia Bronson MD  [facility] Resident, PGY III        This document has been electronically signed by Patricia Bronson MD on March 7, 2019 3:13 PM

## 2019-03-08 RX ORDER — ATORVASTATIN CALCIUM 40 MG/1
40 TABLET, FILM COATED ORAL NIGHTLY
COMMUNITY

## 2019-03-08 NOTE — PROGRESS NOTES
I have reviewed the notes, assessments, and/or procedures performed by Dr Bronson, I concur with her/his documentation and assessment and plan for Jose Sal.                This document has been electronically signed by Aubrey Cabello MD on March 8, 2019 4:08 PM

## 2019-08-09 DIAGNOSIS — E11.42 TYPE 2 DIABETES MELLITUS WITH DIABETIC POLYNEUROPATHY, WITHOUT LONG-TERM CURRENT USE OF INSULIN (HCC): ICD-10-CM

## 2019-09-24 RX ORDER — FEBUXOSTAT 40 MG/1
40 TABLET ORAL DAILY
Qty: 30 TABLET | Refills: 5 | Status: SHIPPED | OUTPATIENT
Start: 2019-09-24

## 2019-09-24 RX ORDER — FEBUXOSTAT 40 MG/1
40 TABLET ORAL DAILY
Qty: 30 TABLET | Refills: 5 | Status: CANCELLED | OUTPATIENT
Start: 2019-09-24

## 2019-11-14 RX ORDER — RANITIDINE 150 MG/1
150 TABLET ORAL DAILY
Qty: 30 TABLET | Refills: 5 | Status: SHIPPED | OUTPATIENT
Start: 2019-11-14

## 2020-02-02 DIAGNOSIS — E11.42 TYPE 2 DIABETES MELLITUS WITH DIABETIC POLYNEUROPATHY, WITHOUT LONG-TERM CURRENT USE OF INSULIN (HCC): ICD-10-CM

## 2020-02-02 RX ORDER — METFORMIN HYDROCHLORIDE 500 MG/1
500 TABLET, EXTENDED RELEASE ORAL 2 TIMES DAILY
Qty: 60 TABLET | Refills: 6 | Status: CANCELLED | OUTPATIENT
Start: 2020-02-02

## 2020-02-03 DIAGNOSIS — E11.42 TYPE 2 DIABETES MELLITUS WITH DIABETIC POLYNEUROPATHY, WITHOUT LONG-TERM CURRENT USE OF INSULIN (HCC): ICD-10-CM

## 2020-02-03 RX ORDER — METFORMIN HYDROCHLORIDE 500 MG/1
500 TABLET, EXTENDED RELEASE ORAL 2 TIMES DAILY
Qty: 60 TABLET | Refills: 0 | Status: SHIPPED | OUTPATIENT
Start: 2020-02-03

## 2020-02-03 NOTE — TELEPHONE ENCOUNTER
Called patient notified him of 30 day refill on his requested Metformin but that he needs an appt for anything further. Patient agreed to call at a later date for an appt.     MARIA GUADALUPE Noe MA.    2/3/20

## 2021-11-10 ENCOUNTER — TELEPHONE (OUTPATIENT)
Dept: FAMILY MEDICINE CLINIC | Facility: CLINIC | Age: 46
End: 2021-11-10